# Patient Record
Sex: FEMALE | Race: WHITE | Employment: OTHER | URBAN - METROPOLITAN AREA
[De-identification: names, ages, dates, MRNs, and addresses within clinical notes are randomized per-mention and may not be internally consistent; named-entity substitution may affect disease eponyms.]

---

## 2022-05-01 ENCOUNTER — HOSPITAL ENCOUNTER (INPATIENT)
Age: 77
LOS: 3 days | Discharge: HOME | DRG: 300 | End: 2022-05-04
Attending: STUDENT IN AN ORGANIZED HEALTH CARE EDUCATION/TRAINING PROGRAM | Admitting: INTERNAL MEDICINE
Payer: MEDICARE

## 2022-05-01 ENCOUNTER — APPOINTMENT (OUTPATIENT)
Dept: VASCULAR SURGERY | Age: 77
DRG: 300 | End: 2022-05-01
Attending: PHYSICIAN ASSISTANT
Payer: MEDICARE

## 2022-05-01 DIAGNOSIS — E11.65 TYPE 2 DIABETES MELLITUS WITH HYPERGLYCEMIA, WITHOUT LONG-TERM CURRENT USE OF INSULIN (HCC): ICD-10-CM

## 2022-05-01 DIAGNOSIS — I77.6 VASCULITIS (HCC): ICD-10-CM

## 2022-05-01 DIAGNOSIS — I82.413 DVT OF DEEP FEMORAL VEIN, BILATERAL (HCC): Primary | ICD-10-CM

## 2022-05-01 DIAGNOSIS — I71.40 ABDOMINAL AORTIC ANEURYSM (AAA) WITHOUT RUPTURE: ICD-10-CM

## 2022-05-01 DIAGNOSIS — R06.02 SOB (SHORTNESS OF BREATH): ICD-10-CM

## 2022-05-01 PROBLEM — I82.409 DVT (DEEP VENOUS THROMBOSIS) (HCC): Status: ACTIVE | Noted: 2022-05-01

## 2022-05-01 PROBLEM — E11.9 DM (DIABETES MELLITUS) (HCC): Status: ACTIVE | Noted: 2022-05-01

## 2022-05-01 PROBLEM — O22.30 DVT (DEEP VEIN THROMBOSIS) IN PREGNANCY: Status: ACTIVE | Noted: 2022-05-01

## 2022-05-01 PROBLEM — J44.9 COPD (CHRONIC OBSTRUCTIVE PULMONARY DISEASE) (HCC): Status: ACTIVE | Noted: 2022-05-01

## 2022-05-01 PROBLEM — L03.90 CELLULITIS: Status: ACTIVE | Noted: 2022-05-01

## 2022-05-01 LAB
ANION GAP SERPL CALC-SCNC: 7 MMOL/L (ref 3–18)
APPEARANCE UR: CLEAR
APTT PPP: 34.7 SEC (ref 23–36.4)
APTT PPP: >180 SEC (ref 23–36.4)
BACTERIA URNS QL MICRO: ABNORMAL /HPF
BASOPHILS # BLD: 0.1 K/UL (ref 0–0.1)
BASOPHILS NFR BLD: 1 % (ref 0–2)
BILIRUB UR QL: NEGATIVE
BUN SERPL-MCNC: 14 MG/DL (ref 7–18)
BUN/CREAT SERPL: 16 (ref 12–20)
CALCIUM SERPL-MCNC: 9.2 MG/DL (ref 8.5–10.1)
CHLORIDE SERPL-SCNC: 104 MMOL/L (ref 100–111)
CO2 SERPL-SCNC: 29 MMOL/L (ref 21–32)
COLOR UR: YELLOW
CREAT SERPL-MCNC: 0.88 MG/DL (ref 0.6–1.3)
CRP SERPL-MCNC: 0.7 MG/DL (ref 0–0.3)
DIFFERENTIAL METHOD BLD: ABNORMAL
EOSINOPHIL # BLD: 0.2 K/UL (ref 0–0.4)
EOSINOPHIL NFR BLD: 3 % (ref 0–5)
EPITH CASTS URNS QL MICRO: ABNORMAL /LPF (ref 0–5)
ERYTHROCYTE [DISTWIDTH] IN BLOOD BY AUTOMATED COUNT: 13.5 % (ref 11.6–14.5)
ERYTHROCYTE [SEDIMENTATION RATE] IN BLOOD: 19 MM/HR (ref 0–30)
GLUCOSE BLD STRIP.AUTO-MCNC: 199 MG/DL (ref 70–110)
GLUCOSE SERPL-MCNC: 183 MG/DL (ref 74–99)
GLUCOSE UR STRIP.AUTO-MCNC: NEGATIVE MG/DL
HCT VFR BLD AUTO: 47 % (ref 35–45)
HGB BLD-MCNC: 15.7 G/DL (ref 12–16)
HGB UR QL STRIP: NEGATIVE
IMM GRANULOCYTES # BLD AUTO: 0 K/UL (ref 0–0.04)
IMM GRANULOCYTES NFR BLD AUTO: 0 % (ref 0–0.5)
INR PPP: 1 (ref 0.8–1.2)
KETONES UR QL STRIP.AUTO: NEGATIVE MG/DL
LACTATE BLD-SCNC: 1.98 MMOL/L (ref 0.4–2)
LACTATE BLD-SCNC: 2.27 MMOL/L (ref 0.4–2)
LEUKOCYTE ESTERASE UR QL STRIP.AUTO: ABNORMAL
LYMPHOCYTES # BLD: 1.6 K/UL (ref 0.9–3.6)
LYMPHOCYTES NFR BLD: 22 % (ref 21–52)
MCH RBC QN AUTO: 30.1 PG (ref 24–34)
MCHC RBC AUTO-ENTMCNC: 33.4 G/DL (ref 31–37)
MCV RBC AUTO: 90.2 FL (ref 78–100)
MONOCYTES # BLD: 0.5 K/UL (ref 0.05–1.2)
MONOCYTES NFR BLD: 7 % (ref 3–10)
NEUTS SEG # BLD: 5.1 K/UL (ref 1.8–8)
NEUTS SEG NFR BLD: 68 % (ref 40–73)
NITRITE UR QL STRIP.AUTO: NEGATIVE
NRBC # BLD: 0 K/UL (ref 0–0.01)
NRBC BLD-RTO: 0 PER 100 WBC
PH UR STRIP: 5.5 [PH] (ref 5–8)
PLATELET # BLD AUTO: 334 K/UL (ref 135–420)
PMV BLD AUTO: 9.5 FL (ref 9.2–11.8)
POTASSIUM SERPL-SCNC: 3.4 MMOL/L (ref 3.5–5.5)
PROT UR STRIP-MCNC: NEGATIVE MG/DL
PROTHROMBIN TIME: 13.3 SEC (ref 11.5–15.2)
RBC # BLD AUTO: 5.21 M/UL (ref 4.2–5.3)
RBC #/AREA URNS HPF: ABNORMAL /HPF (ref 0–5)
SODIUM SERPL-SCNC: 140 MMOL/L (ref 136–145)
SP GR UR REFRACTOMETRY: 1.01 (ref 1–1.03)
TROPONIN-HIGH SENSITIVITY: 5 NG/L (ref 0–54)
UROBILINOGEN UR QL STRIP.AUTO: 0.2 EU/DL (ref 0.2–1)
WBC # BLD AUTO: 7.5 K/UL (ref 4.6–13.2)
WBC URNS QL MICRO: ABNORMAL /HPF (ref 0–5)

## 2022-05-01 PROCEDURE — 74011250636 HC RX REV CODE- 250/636: Performed by: INTERNAL MEDICINE

## 2022-05-01 PROCEDURE — 85730 THROMBOPLASTIN TIME PARTIAL: CPT

## 2022-05-01 PROCEDURE — 80048 BASIC METABOLIC PNL TOTAL CA: CPT

## 2022-05-01 PROCEDURE — 74011250636 HC RX REV CODE- 250/636: Performed by: PHYSICIAN ASSISTANT

## 2022-05-01 PROCEDURE — 94761 N-INVAS EAR/PLS OXIMETRY MLT: CPT

## 2022-05-01 PROCEDURE — 85025 COMPLETE CBC W/AUTO DIFF WBC: CPT

## 2022-05-01 PROCEDURE — 36415 COLL VENOUS BLD VENIPUNCTURE: CPT

## 2022-05-01 PROCEDURE — 74011250637 HC RX REV CODE- 250/637: Performed by: INTERNAL MEDICINE

## 2022-05-01 PROCEDURE — 81001 URINALYSIS AUTO W/SCOPE: CPT

## 2022-05-01 PROCEDURE — 85652 RBC SED RATE AUTOMATED: CPT

## 2022-05-01 PROCEDURE — 99285 EMERGENCY DEPT VISIT HI MDM: CPT

## 2022-05-01 PROCEDURE — 86140 C-REACTIVE PROTEIN: CPT

## 2022-05-01 PROCEDURE — 74011000258 HC RX REV CODE- 258: Performed by: INTERNAL MEDICINE

## 2022-05-01 PROCEDURE — 83605 ASSAY OF LACTIC ACID: CPT

## 2022-05-01 PROCEDURE — 74011000250 HC RX REV CODE- 250: Performed by: INTERNAL MEDICINE

## 2022-05-01 PROCEDURE — 87040 BLOOD CULTURE FOR BACTERIA: CPT

## 2022-05-01 PROCEDURE — 93970 EXTREMITY STUDY: CPT

## 2022-05-01 PROCEDURE — 85610 PROTHROMBIN TIME: CPT

## 2022-05-01 PROCEDURE — 84484 ASSAY OF TROPONIN QUANT: CPT

## 2022-05-01 PROCEDURE — 65270000046 HC RM TELEMETRY

## 2022-05-01 PROCEDURE — C9113 INJ PANTOPRAZOLE SODIUM, VIA: HCPCS | Performed by: INTERNAL MEDICINE

## 2022-05-01 PROCEDURE — 82962 GLUCOSE BLOOD TEST: CPT

## 2022-05-01 RX ORDER — FUROSEMIDE 40 MG/1
TABLET ORAL DAILY
COMMUNITY

## 2022-05-01 RX ORDER — POTASSIUM CHLORIDE 20 MEQ/1
40 TABLET, EXTENDED RELEASE ORAL EVERY 4 HOURS
Status: COMPLETED | OUTPATIENT
Start: 2022-05-01 | End: 2022-05-01

## 2022-05-01 RX ORDER — DILTIAZEM HYDROCHLORIDE 60 MG/1
TABLET, FILM COATED ORAL 4 TIMES DAILY
COMMUNITY

## 2022-05-01 RX ORDER — METFORMIN HYDROCHLORIDE 500 MG/1
TABLET ORAL 2 TIMES DAILY WITH MEALS
COMMUNITY

## 2022-05-01 RX ORDER — SODIUM CHLORIDE 0.9 % (FLUSH) 0.9 %
5-40 SYRINGE (ML) INJECTION EVERY 8 HOURS
Status: DISCONTINUED | OUTPATIENT
Start: 2022-05-01 | End: 2022-05-04 | Stop reason: HOSPADM

## 2022-05-01 RX ORDER — ATORVASTATIN CALCIUM 40 MG/1
TABLET, FILM COATED ORAL DAILY
COMMUNITY

## 2022-05-01 RX ORDER — MUPIROCIN 20 MG/G
OINTMENT TOPICAL DAILY
COMMUNITY

## 2022-05-01 RX ORDER — SODIUM CHLORIDE 0.9 % (FLUSH) 0.9 %
5-40 SYRINGE (ML) INJECTION AS NEEDED
Status: DISCONTINUED | OUTPATIENT
Start: 2022-05-01 | End: 2022-05-04 | Stop reason: HOSPADM

## 2022-05-01 RX ORDER — HEPARIN SODIUM 10000 [USP'U]/100ML
18-36 INJECTION, SOLUTION INTRAVENOUS
Status: DISCONTINUED | OUTPATIENT
Start: 2022-05-01 | End: 2022-05-01 | Stop reason: RX

## 2022-05-01 RX ORDER — POLYETHYLENE GLYCOL 3350 17 G/17G
17 POWDER, FOR SOLUTION ORAL DAILY PRN
Status: DISCONTINUED | OUTPATIENT
Start: 2022-05-01 | End: 2022-05-04 | Stop reason: HOSPADM

## 2022-05-01 RX ORDER — ACETAMINOPHEN 325 MG/1
650 TABLET ORAL
Status: DISCONTINUED | OUTPATIENT
Start: 2022-05-01 | End: 2022-05-04 | Stop reason: HOSPADM

## 2022-05-01 RX ORDER — ACETAMINOPHEN 650 MG/1
650 SUPPOSITORY RECTAL
Status: DISCONTINUED | OUTPATIENT
Start: 2022-05-01 | End: 2022-05-04 | Stop reason: HOSPADM

## 2022-05-01 RX ORDER — HEPARIN SODIUM 1000 [USP'U]/ML
80 INJECTION, SOLUTION INTRAVENOUS; SUBCUTANEOUS ONCE
Status: COMPLETED | OUTPATIENT
Start: 2022-05-01 | End: 2022-05-01

## 2022-05-01 RX ORDER — ONDANSETRON 4 MG/1
4 TABLET, ORALLY DISINTEGRATING ORAL
Status: DISCONTINUED | OUTPATIENT
Start: 2022-05-01 | End: 2022-05-04 | Stop reason: HOSPADM

## 2022-05-01 RX ORDER — GABAPENTIN 300 MG/1
300 CAPSULE ORAL 3 TIMES DAILY
COMMUNITY

## 2022-05-01 RX ORDER — ASPIRIN 81 MG/1
TABLET ORAL DAILY
COMMUNITY

## 2022-05-01 RX ORDER — IPRATROPIUM BROMIDE AND ALBUTEROL SULFATE 2.5; .5 MG/3ML; MG/3ML
3 SOLUTION RESPIRATORY (INHALATION)
Status: DISCONTINUED | OUTPATIENT
Start: 2022-05-01 | End: 2022-05-03 | Stop reason: SDUPTHER

## 2022-05-01 RX ORDER — ONDANSETRON 2 MG/ML
4 INJECTION INTRAMUSCULAR; INTRAVENOUS
Status: DISCONTINUED | OUTPATIENT
Start: 2022-05-01 | End: 2022-05-04 | Stop reason: HOSPADM

## 2022-05-01 RX ORDER — IBUPROFEN 200 MG
1 TABLET ORAL
Status: COMPLETED | OUTPATIENT
Start: 2022-05-01 | End: 2022-05-02

## 2022-05-01 RX ORDER — GLIMEPIRIDE 2 MG/1
TABLET ORAL 2 TIMES DAILY
COMMUNITY

## 2022-05-01 RX ORDER — IBUPROFEN 200 MG
1 TABLET ORAL DAILY
Status: DISCONTINUED | OUTPATIENT
Start: 2022-05-02 | End: 2022-05-01

## 2022-05-01 RX ADMIN — PIPERACILLIN AND TAZOBACTAM 3.38 G: 3; .375 INJECTION, POWDER, LYOPHILIZED, FOR SOLUTION INTRAVENOUS at 20:03

## 2022-05-01 RX ADMIN — POTASSIUM CHLORIDE 40 MEQ: 20 TABLET, EXTENDED RELEASE ORAL at 20:01

## 2022-05-01 RX ADMIN — SODIUM CHLORIDE, PRESERVATIVE FREE 40 MG: 5 INJECTION INTRAVENOUS at 20:02

## 2022-05-01 RX ADMIN — HEPARIN SODIUM 9800 UNITS: 1000 INJECTION INTRAVENOUS; SUBCUTANEOUS at 16:13

## 2022-05-01 RX ADMIN — SODIUM CHLORIDE, PRESERVATIVE FREE 5 ML: 5 INJECTION INTRAVENOUS at 22:00

## 2022-05-01 RX ADMIN — HEPARIN SODIUM 18 UNITS/KG/HR: 5000 INJECTION INTRAVENOUS; SUBCUTANEOUS at 17:58

## 2022-05-01 RX ADMIN — POTASSIUM CHLORIDE 40 MEQ: 20 TABLET, EXTENDED RELEASE ORAL at 23:26

## 2022-05-01 NOTE — ROUTINE PROCESS
Bedside and Verbal shift change report given to Vic (oncoming nurse) by Des Christianson RN (offgoing nurse). Report included the following information SBAR, Kardex, MAR and Recent Results.

## 2022-05-01 NOTE — Clinical Note
Status[de-identified] INPATIENT [101]   Type of Bed: Telemetry [19]   Cardiac Monitoring Required?: No   Inpatient Hospitalization Certified Necessary for the Following Reasons: 3.  Patient receiving treatment that can only be provided in an inpatient setting (further clarification in H&P documentation)   Admitting Diagnosis: DVT (deep vein thrombosis) in pregnancy [[id number]]   Admitting Diagnosis: Vasculitis [facility]) [[id number]]   Admitting Physician: Coral Benitez [[id number]]   Attending Physician: Coral Benitez [[id number]]   Estimated Length of Stay: 2 Midnights   Discharge Plan[de-identified] Home with Office Follow-up

## 2022-05-01 NOTE — ED TRIAGE NOTES
Patient reports that she has cellulitis to right ankle. [facility] and [facility] told her to come to ed they could not help her. States she is here visiting with daughter. Patient reports she has had this before and it had debridement.  This started this time about a week or so ago

## 2022-05-01 NOTE — H&P
History & Physical    Patient: Yonas Gaffney MRN: [id number]  CSN: [id number]    YOB: 1945  Age: 78 y.o. Sex: female      DOA: 5/1/2022    Chief Complaint:   Chief Complaint   Patient presents with    Skin Problem          HPI:     Yonas Gaffney is a 78 y.o.  female who with history of COPD, diabetes, spinal stenosis, abdominal aortic aneurysm tobacco disorder presents to the emergency room at the urging of urgent care for worsening cellulitis. Patient had lower extremity edema and pain  As well as swelling of the right lower leg she was started on Bactroban and Keflex in urgent care. Pictures were taken prior  Antibiotics the swelling and redness in her lower extremity continued. Patient also noticed that she had some worsening dyspnea with exertion she does have COPD. She traveled from [place] to visit her daughter. And then 1 day later traveled by car to [place]. Her daughter also works in EMS and EMS  Her daughter also works as a medical transporter and she had been taking badge with her daughter. She smokes about a pack a day she denies any COVID exposures she is triple vaccinated  In the emergency room she was found to have bilateral DVT into the femoral vein I am asked to admit for further treatment of her vasculitis bilateral leg DVT. Past Medical History:   Diagnosis Date    AAA (abdominal aortic aneurysm) ([facility] [address]     its not big enough to operate on yet she sees a vascular dr Len Bailey Chronic obstructive pulmonary disease ([facility] [address]     Diabetes ([facility] [address]     Spinal stenosis        No past surgical history on file. No family history on file.     Social History     Socioeconomic History    Marital status:    Tobacco Use    Smoking status: Current Every Day Smoker     Packs/day: 0.50   Substance and Sexual Activity    Alcohol use: Not Currently    Drug use: Never       Prior to Admission medications    Not on File       Allergies   Allergen Reactions  Crestor [Rosuvastatin] Myalgia         Review of Systems  GENERAL: Patient alert, awake and oriented times 3, able to communicate full sentences and not in distress. HEENT: No change in vision, no earache, tinnitus, sore throat or sinus congestion. NECK: No pain or stiffness. PULMONARY: +shortness of breath, cough or wheeze. Cardiovascular: no pnd or orthopnea, no CP  GASTROINTESTINAL: No abdominal pain, nausea, vomiting or diarrhea, melena or bright red blood per rectum. GENITOURINARY: No urinary frequency, urgency, hesitancy or dysuria. MUSCULOSKELETAL: No joint or muscle pain, no back pain, no recent trauma. DERMATOLOGIC+ rash, no itching, no lesions. ENDOCRINE: No polyuria, polydipsia, no heat or cold intolerance. No recent change in weight. HEMATOLOGICAL: No anemia or easy bruising or bleeding. NEUROLOGIC: No headache, seizures, numbness, tingling +r weakness. Physical Exam:     Physical Exam:  Visit Vitals  BP (!) 145/76 (BP 1 Location: Left arm)   Pulse 85   Temp 97.3 °F (36.3 °C)   Resp 16   Ht 5' 7\" (1.702 m)   Wt 122.5 kg (270 lb)   SpO2 97%   BMI 42.29 kg/m²      O2 Device: None (Room air)    Temp (24hrs), Av.3 °F (36.3 °C), Min:97.3 °F (36.3 °C), Max:97.3 °F (36.3 °C)    No intake/output data recorded. No intake/output data recorded. General:  Alert, cooperative, no distress, appears stated age. Head: Normocephalic, without obvious abnormality, atraumatic. Eyes:  Conjunctivae/corneas clear. PERRL, EOMs intact. Nose: Nares normal. No drainage or sinus tenderness. Neck: Supple, symmetrical, trachea midline, no adenopathy, thyroid: no enlargement, no carotid bruit and no JVD. Lungs:   Clear to auscultation bilaterally. Occasional expiratory wheeze   Heart:  Regular rate and rhythm, S1, S2 normal.  Soft systolic murmur     Abdomen: Soft, non-tender.  Bowel sounds normal.    Extremities: Extremities normal, atraumatic, bilateral stasis edema with a macular papular rash with warmth and erythema on the right lateral malleolus of the lower extremity there is warmth and blanching as well as petechial changes   Pulses: 2+ and symmetric all extremities. Skin:  vasculitis rash on lower leg right lateral maleus worse than left flat feet bilateral surgical scar   Neurologic: AAOx3, No focal motor or sensory deficit. Labs Reviewed:  Recent Results (from the past 12 hour(s))   CBC WITH AUTOMATED DIFF    Collection Time: 05/01/22  2:04 PM   Result Value Ref Range    WBC 7.5 4.6 - 13.2 K/uL    RBC 5.21 4.20 - 5.30 M/uL    HGB 15.7 12.0 - 16.0 g/dL    HCT 47.0 (H) 35.0 - 45.0 %    MCV 90.2 78.0 - 100.0 FL    MCH 30.1 24.0 - 34.0 PG    MCHC 33.4 31.0 - 37.0 g/dL    RDW 13.5 11.6 - 14.5 %    PLATELET 850 094 - 231 K/uL    MPV 9.5 9.2 - 11.8 FL    NRBC 0.0 0  WBC    ABSOLUTE NRBC 0.00 0.00 - 0.01 K/uL    NEUTROPHILS 68 40 - 73 %    LYMPHOCYTES 22 21 - 52 %    MONOCYTES 7 3 - 10 %    EOSINOPHILS 3 0 - 5 %    BASOPHILS 1 0 - 2 %    IMMATURE GRANULOCYTES 0 0.0 - 0.5 %    ABS. NEUTROPHILS 5.1 1.8 - 8.0 K/UL    ABS. LYMPHOCYTES 1.6 0.9 - 3.6 K/UL    ABS. MONOCYTES 0.5 0.05 - 1.2 K/UL    ABS. EOSINOPHILS 0.2 0.0 - 0.4 K/UL    ABS. BASOPHILS 0.1 0.0 - 0.1 K/UL    ABS. IMM.  GRANS. 0.0 0.00 - 0.04 K/UL    DF AUTOMATED     METABOLIC PANEL, BASIC    Collection Time: 05/01/22  2:04 PM   Result Value Ref Range    Sodium 140 136 - 145 mmol/L    Potassium 3.4 (L) 3.5 - 5.5 mmol/L    Chloride 104 100 - 111 mmol/L    CO2 29 21 - 32 mmol/L    Anion gap 7 3.0 - 18 mmol/L    Glucose 183 (H) 74 - 99 mg/dL    BUN 14 7.0 - 18 MG/DL    Creatinine 0.88 0.6 - 1.3 MG/DL    BUN/Creatinine ratio 16 12 - 20      GFR est AA >60 >60 ml/min/1.73m2    GFR est non-AA >60 >60 ml/min/1.73m2    Calcium 9.2 8.5 - 10.1 MG/DL   PROTHROMBIN TIME + INR    Collection Time: 05/01/22  2:04 PM   Result Value Ref Range    Prothrombin time 13.3 11.5 - 15.2 sec    INR 1.0 0.8 - 1.2     PTT    Collection Time: 05/01/22  2:04 PM   Result Value Ref Range    aPTT 34.7 23.0 - 36.4 SEC   SED RATE (ESR)    Collection Time: 05/01/22  2:04 PM   Result Value Ref Range    Sed rate, automated 19 0 - 30 mm/hr   POC LACTIC ACID    Collection Time: 05/01/22  2:55 PM   Result Value Ref Range    Lactic Acid (POC) 2.27 (HH) 0.40 - 2.00 mmol/L   POC LACTIC ACID    Collection Time: 05/01/22  3:01 PM   Result Value Ref Range    Lactic Acid (POC) 1.98 0.40 - 2.00 mmol/L   URINALYSIS W/ RFLX MICROSCOPIC    Collection Time: 05/01/22  4:08 PM   Result Value Ref Range    Color YELLOW      Appearance CLEAR      Specific gravity 1.008 1.005 - 1.030      pH (UA) 5.5 5.0 - 8.0      Protein Negative NEG mg/dL    Glucose Negative NEG mg/dL    Ketone Negative NEG mg/dL    Bilirubin Negative NEG      Blood Negative NEG      Urobilinogen 0.2 0.2 - 1.0 EU/dL    Nitrites Negative NEG      Leukocyte Esterase TRACE (A) NEG     URINE MICROSCOPIC ONLY    Collection Time: 05/01/22  4:08 PM   Result Value Ref Range    WBC 0 to 3 0 - 5 /hpf    RBC 0 to 3 0 - 5 /hpf    Epithelial cells FEW 0 - 5 /lpf    Bacteria FEW (A) NEG /hpf       All lab results for the last 24 hours reviewed.   .  And EKG  Was  Procedures/imaging: see electronic medical records for all procedures/Xrays and details which were not copied into this note but were reviewed prior to creation of Plan    Recent Results (from the past 12 hour(s))   CBC WITH AUTOMATED DIFF    Collection Time: 05/01/22  2:04 PM   Result Value Ref Range    WBC 7.5 4.6 - 13.2 K/uL    RBC 5.21 4.20 - 5.30 M/uL    HGB 15.7 12.0 - 16.0 g/dL    HCT 47.0 (H) 35.0 - 45.0 %    MCV 90.2 78.0 - 100.0 FL    MCH 30.1 24.0 - 34.0 PG    MCHC 33.4 31.0 - 37.0 g/dL    RDW 13.5 11.6 - 14.5 %    PLATELET 670 605 - 725 K/uL    MPV 9.5 9.2 - 11.8 FL    NRBC 0.0 0  WBC    ABSOLUTE NRBC 0.00 0.00 - 0.01 K/uL    NEUTROPHILS 68 40 - 73 %    LYMPHOCYTES 22 21 - 52 %    MONOCYTES 7 3 - 10 %    EOSINOPHILS 3 0 - 5 %    BASOPHILS 1 0 - 2 % IMMATURE GRANULOCYTES 0 0.0 - 0.5 %    ABS. NEUTROPHILS 5.1 1.8 - 8.0 K/UL    ABS. LYMPHOCYTES 1.6 0.9 - 3.6 K/UL    ABS. MONOCYTES 0.5 0.05 - 1.2 K/UL    ABS. EOSINOPHILS 0.2 0.0 - 0.4 K/UL    ABS. BASOPHILS 0.1 0.0 - 0.1 K/UL    ABS. IMM.  GRANS. 0.0 0.00 - 0.04 K/UL    DF AUTOMATED     METABOLIC PANEL, BASIC    Collection Time: 05/01/22  2:04 PM   Result Value Ref Range    Sodium 140 136 - 145 mmol/L    Potassium 3.4 (L) 3.5 - 5.5 mmol/L    Chloride 104 100 - 111 mmol/L    CO2 29 21 - 32 mmol/L    Anion gap 7 3.0 - 18 mmol/L    Glucose 183 (H) 74 - 99 mg/dL    BUN 14 7.0 - 18 MG/DL    Creatinine 0.88 0.6 - 1.3 MG/DL    BUN/Creatinine ratio 16 12 - 20      GFR est AA >60 >60 ml/min/1.73m2    GFR est non-AA >60 >60 ml/min/1.73m2    Calcium 9.2 8.5 - 10.1 MG/DL   PROTHROMBIN TIME + INR    Collection Time: 05/01/22  2:04 PM   Result Value Ref Range    Prothrombin time 13.3 11.5 - 15.2 sec    INR 1.0 0.8 - 1.2     PTT    Collection Time: 05/01/22  2:04 PM   Result Value Ref Range    aPTT 34.7 23.0 - 36.4 SEC   POC LACTIC ACID    Collection Time: 05/01/22  2:55 PM   Result Value Ref Range    Lactic Acid (POC) 2.27 (HH) 0.40 - 2.00 mmol/L   POC LACTIC ACID    Collection Time: 05/01/22  3:01 PM   Result Value Ref Range    Lactic Acid (POC) 1.98 0.40 - 2.00 mmol/L   URINALYSIS W/ RFLX MICROSCOPIC    Collection Time: 05/01/22  4:08 PM   Result Value Ref Range    Color YELLOW      Appearance CLEAR      Specific gravity 1.008 1.005 - 1.030      pH (UA) 5.5 5.0 - 8.0      Protein Negative NEG mg/dL    Glucose Negative NEG mg/dL    Ketone Negative NEG mg/dL    Bilirubin Negative NEG      Blood Negative NEG      Urobilinogen 0.2 0.2 - 1.0 EU/dL    Nitrites Negative NEG      Leukocyte Esterase TRACE (A) NEG     URINE MICROSCOPIC ONLY    Collection Time: 05/01/22  4:08 PM   Result Value Ref Range    WBC 0 to 3 0 - 5 /hpf    RBC 0 to 3 0 - 5 /hpf    Epithelial cells FEW 0 - 5 /lpf    Bacteria FEW (A) NEG /hpf     Assessment/Plan Active Problems:  21-year-old with history of COPD diabetes abdominal aneurysm admitted with bilateral DVT and shortness of breath as well as cellulitis    1. Bilateral DVT  She started on a heparin drip we will monitor for bleeding with daily CBCs  She can transition to oral NOAC after 24 to 48 hours    2. Dyspnea with COPD   we will check a CTA started on Zosyn DuoNebs as needed  Also check echocardiogram  3. Cellulitis   lower extremity IV Zosyn failed Keflex  4. History of abdominal aneurysm   now on anticoagulation will check CTA keep blood pressure under good control  5. Diabetes   no concentrated sweet diet sliding scale insulin hold oral agents low-dose Lantus  6.   Tobacco disorder   advised to quit started on patch  DVT/GI Prophylaxis: Heparin drip PPI with Protonix                    Camille Gifford MD  5/1/2022 4:37 PM

## 2022-05-01 NOTE — ED PROVIDER NOTES
EMERGENCY DEPARTMENT HISTORY AND PHYSICAL EXAM    Date: 5/1/2022  Patient Name: Yuri Valderrama    History of Presenting Illness     Time Seen: 2:05 PM      Chief Complaint   Patient presents with    Skin Problem       History Provided By: Patient and Patient's Daughter    Additional History (Context): Yuri Valderrama is a 78 y.o. female visiting family here from [place] presents to the emergency room after being sent here by local urgent care center for worsening cellulitis of the right lower extremity. Patient was seen and evaluated at [facility] urgent care [place] last week and was diagnosed with cellulitis. Placed on mupirocin ointment and Keflex. Symptoms worsening. Now has swelling and edema involving the lower extremity mid calf all the way down to her feet. Redness seems to be getting little worse. Denies fever. No chills or aches. Swelling worsens when patient is on her feet. Denies any bleeding or drainage from the reddened area on the lateral ankle. No recent open wounds. Patient is diabetic currently on metformin. PCP: Other, MD Елена        Past History     Past Medical History:  Past Medical History:   Diagnosis Date    AAA (abdominal aortic aneurysm) ([facility] [address]     its not big enough to operate on yet she sees a vascular dr   Tutu Reeves Chronic obstructive pulmonary disease ([facility] [address]     Diabetes ([facility] [address]     Spinal stenosis        Past Surgical History:  No past surgical history on file. Family History:  No family history on file. Social History:  Social History     Tobacco Use    Smoking status: Current Every Day Smoker     Packs/day: 0.50    Smokeless tobacco: Not on file   Substance Use Topics    Alcohol use: Not Currently    Drug use: Never       Allergies: Allergies   Allergen Reactions    Crestor [Rosuvastatin] Myalgia         Review of Systems   Review of Systems   Constitutional: Negative for chills and fever.    Respiratory: Negative for cough, chest tightness and shortness of breath. Cardiovascular: Positive for leg swelling. Negative for chest pain and palpitations. Gastrointestinal: Negative for abdominal pain. Musculoskeletal:        Right leg swelling  Right lower leg pain   Skin: Positive for color change. Negative for pallor, rash and wound. Neurological: Positive for numbness. Negative for weakness and light-headedness. All other systems reviewed and are negative. Physical Exam     Vitals:    22 1341 22 1730   BP: (!) 145/76 (!) 133/57   Pulse: 85 73   Resp: 16 17   Temp: 97.3 °F (36.3 °C)    SpO2: 97% 97%   Weight: 122.5 kg (270 lb)    Height: 5' 7\" (1.702 m)      Physical Exam  Vitals and nursing note reviewed. Constitutional:       General: She is not in acute distress. Appearance: She is well-developed and well-groomed. She is obese. She is not ill-appearing or diaphoretic. Comments: 82-year-old female no apparent distress. Vital signs are stable. Cooperative. HENT:      Mouth/Throat:      Mouth: Mucous membranes are moist.      Pharynx: Oropharynx is clear. Eyes:      Extraocular Movements: Extraocular movements intact. Conjunctiva/sclera: Conjunctivae normal.      Pupils: Pupils are equal, round, and reactive to light. Cardiovascular:      Rate and Rhythm: Normal rate and regular rhythm. Heart sounds: Normal heart sounds. Pulmonary:      Effort: Pulmonary effort is normal.      Breath sounds: Normal breath sounds. Abdominal:      General: Bowel sounds are normal.      Palpations: Abdomen is soft. Tenderness: There is no abdominal tenderness. There is no right CVA tenderness or left CVA tenderness. Musculoskeletal:         General: Swelling present. No tenderness or signs of injury. Cervical back: Neck supple. Right lower le+ Pitting Edema present. Left lower le+ Pitting Edema present.         Feet:       Comments: Right lower extremity -obvious edema noted to right lower extremity involving the mid lower leg extending distally through the ankle and foot. Pitting. Along the lateral aspect of the ankle there is a large erythematous patch. Raised. Minimal warmth. Nontender. No drainage. There is surrounding erythema again no warmth or induration. Nontender. There are old surgical incisions noted to bilateral knees anteriorly in a vertical fashion. There is evidence of varicosities noted to both lower extremities more dominant in the lower legs, ankles and feet. Skin:     General: Skin is warm and dry. Capillary Refill: Capillary refill takes less than 2 seconds. Findings: Petechiae and rash present. Comments: There are petechial markings noted to both lower extremities starting mid lower legs down through the ankles. Right leg is worse than left. Neurological:      Mental Status: She is alert and oriented to person, place, and time. Psychiatric:         Mood and Affect: Mood normal.         Behavior: Behavior normal. Behavior is cooperative. Nursing note and vitals reviewed         Diagnostic Study Results     Labs -     Recent Results (from the past 12 hour(s))   CBC WITH AUTOMATED DIFF    Collection Time: 05/01/22  2:04 PM   Result Value Ref Range    WBC 7.5 4.6 - 13.2 K/uL    RBC 5.21 4.20 - 5.30 M/uL    HGB 15.7 12.0 - 16.0 g/dL    HCT 47.0 (H) 35.0 - 45.0 %    MCV 90.2 78.0 - 100.0 FL    MCH 30.1 24.0 - 34.0 PG    MCHC 33.4 31.0 - 37.0 g/dL    RDW 13.5 11.6 - 14.5 %    PLATELET 936 327 - 785 K/uL    MPV 9.5 9.2 - 11.8 FL    NRBC 0.0 0  WBC    ABSOLUTE NRBC 0.00 0.00 - 0.01 K/uL    NEUTROPHILS 68 40 - 73 %    LYMPHOCYTES 22 21 - 52 %    MONOCYTES 7 3 - 10 %    EOSINOPHILS 3 0 - 5 %    BASOPHILS 1 0 - 2 %    IMMATURE GRANULOCYTES 0 0.0 - 0.5 %    ABS. NEUTROPHILS 5.1 1.8 - 8.0 K/UL    ABS. LYMPHOCYTES 1.6 0.9 - 3.6 K/UL    ABS. MONOCYTES 0.5 0.05 - 1.2 K/UL    ABS. EOSINOPHILS 0.2 0.0 - 0.4 K/UL    ABS.  BASOPHILS 0.1 0.0 - 0.1 K/UL    ABS. IMM. GRANS. 0.0 0.00 - 0.04 K/UL    DF AUTOMATED     METABOLIC PANEL, BASIC    Collection Time: 05/01/22  2:04 PM   Result Value Ref Range    Sodium 140 136 - 145 mmol/L    Potassium 3.4 (L) 3.5 - 5.5 mmol/L    Chloride 104 100 - 111 mmol/L    CO2 29 21 - 32 mmol/L    Anion gap 7 3.0 - 18 mmol/L    Glucose 183 (H) 74 - 99 mg/dL    BUN 14 7.0 - 18 MG/DL    Creatinine 0.88 0.6 - 1.3 MG/DL    BUN/Creatinine ratio 16 12 - 20      GFR est AA >60 >60 ml/min/1.73m2    GFR est non-AA >60 >60 ml/min/1.73m2    Calcium 9.2 8.5 - 10.1 MG/DL   PROTHROMBIN TIME + INR    Collection Time: 05/01/22  2:04 PM   Result Value Ref Range    Prothrombin time 13.3 11.5 - 15.2 sec    INR 1.0 0.8 - 1.2     PTT    Collection Time: 05/01/22  2:04 PM   Result Value Ref Range    aPTT 34.7 23.0 - 36.4 SEC   POC LACTIC ACID    Collection Time: 05/01/22  2:55 PM   Result Value Ref Range    Lactic Acid (POC) 2.27 (HH) 0.40 - 2.00 mmol/L   POC LACTIC ACID    Collection Time: 05/01/22  3:01 PM   Result Value Ref Range    Lactic Acid (POC) 1.98 0.40 - 2.00 mmol/L   URINALYSIS W/ RFLX MICROSCOPIC    Collection Time: 05/01/22  4:08 PM   Result Value Ref Range    Color YELLOW      Appearance CLEAR      Specific gravity 1.008 1.005 - 1.030      pH (UA) 5.5 5.0 - 8.0      Protein Negative NEG mg/dL    Glucose Negative NEG mg/dL    Ketone Negative NEG mg/dL    Bilirubin Negative NEG      Blood Negative NEG      Urobilinogen 0.2 0.2 - 1.0 EU/dL    Nitrites Negative NEG      Leukocyte Esterase TRACE (A) NEG     URINE MICROSCOPIC ONLY    Collection Time: 05/01/22  4:08 PM   Result Value Ref Range    WBC 0 to 3 0 - 5 /hpf    RBC 0 to 3 0 - 5 /hpf    Epithelial cells FEW 0 - 5 /lpf    Bacteria FEW (A) NEG /hpf       Radiologic Studies   Interpretation Summary    · Acute non-occlusive thrombus present in the right proximal femoral vein. · Acute non-occlusive thrombus present in the right profunda femoral vein.   · Acute non-occlusive thrombus present in the left profunda femoral vein. · Acute non-occlusive thrombus present in the left proximal femoral vein. DUPLEX LOWER EXT VENOUS BILAT    (Results Pending)   CTA CHEST W OR W WO CONT    (Results Pending)   CT ABD PELV W CONT    (Results Pending)     CT Results  (Last 48 hours)    None        CXR Results  (Last 48 hours)    None            Medical Decision Making   I am the first provider for this patient. I reviewed the vital signs, available nursing notes, past medical history, past surgical history, family history and social history. Vital Signs-Reviewed the patient's vital signs. Pulse Oximetry Analysis 97% on room air    Records Reviewed: Nursing Notes and Old Medical Records    DDX:  Vasculitis bilateral lower extremities right greater than left  Cellulitis right lower extremity  Peripheral edema /venous insufficiency  Diabetes mellitus type 2    Procedures:  Procedures    ED Course:   Initial assessment performed. The patients presenting problems have been discussed, and they are in agreement with the care plan formulated and outlined with them. I have encouraged them to ask questions as they arise throughout their visit. ED Physician Discussion Note:   Had ER attending Dr. Charles Galo come back and take a look at the patient after I initially evaluated her with concerns that her inflammation was more vasculitis than infectious process/cellulitis. After evaluating the patient, he agreed this is most likely vasculitis. However impressed with the amount of edema that the patient was experienced in her legs. Believes this could very well be multifactorial.    Patient and daughter did admit that they have been doing a lot of traveling. Travel down [address] as well as around here. Per the daughter, mother goes on daily travels with her as she works for medical transport.     Dr. Charles Galo recommended getting venous Doppler ultrasounds of both lower extremities just to make sure there is no abnormality. Consideration of the right lower extremity was already being contemplated. Vascular tech noted that patient has bilateral DVTs. Patient informed of these results. Patient will now require admission to the hospital.    Patient was initiated on IV heparin due to diagnosis of DVT. This case was discussed with hospitalist attending Dr. Maxime Palmer. Patient will be admitted to the hospital for further evaluation and treatment. Diagnosis and Disposition       Admit - Telemetry Dr. Maxime Palmer    CLINICAL IMPRESSION:    1. DVT of deep femoral vein, bilateral (HCC)    2. Vasculitis ([address]    3. Type 2 diabetes mellitus with hyperglycemia, without long-term current use of insulin (HCC)    4. Abdominal aortic aneurysm (AAA) without rupture ([address]        PLAN:  1. Pancho Palmer    Telemetry      Please note that this dictation was completed with [employer], the computer voice recognition software. Quite often unanticipated grammatical, syntax, homophones, and other interpretive errors are inadvertently transcribed by the computer software. Please disregard these errors. Please excuse any errors that have escaped final proofreading.

## 2022-05-02 ENCOUNTER — APPOINTMENT (OUTPATIENT)
Dept: NON INVASIVE DIAGNOSTICS | Age: 77
DRG: 300 | End: 2022-05-02
Attending: INTERNAL MEDICINE
Payer: MEDICARE

## 2022-05-02 ENCOUNTER — APPOINTMENT (OUTPATIENT)
Dept: CT IMAGING | Age: 77
DRG: 300 | End: 2022-05-02
Attending: INTERNAL MEDICINE
Payer: MEDICARE

## 2022-05-02 LAB
ALBUMIN SERPL-MCNC: 2.8 G/DL (ref 3.4–5)
ALBUMIN/GLOB SERPL: 0.9 {RATIO} (ref 0.8–1.7)
ALP SERPL-CCNC: 94 U/L (ref 45–117)
ALT SERPL-CCNC: 15 U/L (ref 13–56)
ANION GAP SERPL CALC-SCNC: 5 MMOL/L (ref 3–18)
APTT PPP: 102.7 SEC (ref 23–36.4)
APTT PPP: 133.2 SEC (ref 23–36.4)
APTT PPP: 143 SEC (ref 23–36.4)
APTT PPP: >180 SEC (ref 23–36.4)
AST SERPL-CCNC: 11 U/L (ref 10–38)
BASOPHILS # BLD: 0.1 K/UL (ref 0–0.1)
BASOPHILS NFR BLD: 1 % (ref 0–2)
BILIRUB SERPL-MCNC: 0.4 MG/DL (ref 0.2–1)
BUN SERPL-MCNC: 14 MG/DL (ref 7–18)
BUN/CREAT SERPL: 17 (ref 12–20)
CALCIUM SERPL-MCNC: 8.6 MG/DL (ref 8.5–10.1)
CHLORIDE SERPL-SCNC: 109 MMOL/L (ref 100–111)
CO2 SERPL-SCNC: 27 MMOL/L (ref 21–32)
CREAT SERPL-MCNC: 0.83 MG/DL (ref 0.6–1.3)
DIFFERENTIAL METHOD BLD: ABNORMAL
EOSINOPHIL # BLD: 0.2 K/UL (ref 0–0.4)
EOSINOPHIL NFR BLD: 4 % (ref 0–5)
ERYTHROCYTE [DISTWIDTH] IN BLOOD BY AUTOMATED COUNT: 13.5 % (ref 11.6–14.5)
EST. AVERAGE GLUCOSE BLD GHB EST-MCNC: 180 MG/DL
GLOBULIN SER CALC-MCNC: 3.1 G/DL (ref 2–4)
GLUCOSE BLD STRIP.AUTO-MCNC: 118 MG/DL (ref 70–110)
GLUCOSE BLD STRIP.AUTO-MCNC: 220 MG/DL (ref 70–110)
GLUCOSE BLD STRIP.AUTO-MCNC: 224 MG/DL (ref 70–110)
GLUCOSE SERPL-MCNC: 146 MG/DL (ref 74–99)
HBA1C MFR BLD: 7.9 % (ref 4.2–5.6)
HCT VFR BLD AUTO: 40.9 % (ref 35–45)
HGB BLD-MCNC: 13.4 G/DL (ref 12–16)
IMM GRANULOCYTES # BLD AUTO: 0 K/UL (ref 0–0.04)
IMM GRANULOCYTES NFR BLD AUTO: 1 % (ref 0–0.5)
LYMPHOCYTES # BLD: 1.8 K/UL (ref 0.9–3.6)
LYMPHOCYTES NFR BLD: 29 % (ref 21–52)
MAGNESIUM SERPL-MCNC: 1.9 MG/DL (ref 1.6–2.6)
MCH RBC QN AUTO: 29.5 PG (ref 24–34)
MCHC RBC AUTO-ENTMCNC: 32.8 G/DL (ref 31–37)
MCV RBC AUTO: 89.9 FL (ref 78–100)
MONOCYTES # BLD: 0.5 K/UL (ref 0.05–1.2)
MONOCYTES NFR BLD: 7 % (ref 3–10)
NEUTS SEG # BLD: 3.6 K/UL (ref 1.8–8)
NEUTS SEG NFR BLD: 58 % (ref 40–73)
NRBC # BLD: 0 K/UL (ref 0–0.01)
NRBC BLD-RTO: 0 PER 100 WBC
PLATELET # BLD AUTO: 298 K/UL (ref 135–420)
PMV BLD AUTO: 9.7 FL (ref 9.2–11.8)
POTASSIUM SERPL-SCNC: 4.3 MMOL/L (ref 3.5–5.5)
PROT SERPL-MCNC: 5.9 G/DL (ref 6.4–8.2)
RBC # BLD AUTO: 4.55 M/UL (ref 4.2–5.3)
SODIUM SERPL-SCNC: 141 MMOL/L (ref 136–145)
WBC # BLD AUTO: 6.1 K/UL (ref 4.6–13.2)

## 2022-05-02 PROCEDURE — 74011250637 HC RX REV CODE- 250/637: Performed by: INTERNAL MEDICINE

## 2022-05-02 PROCEDURE — C9113 INJ PANTOPRAZOLE SODIUM, VIA: HCPCS | Performed by: INTERNAL MEDICINE

## 2022-05-02 PROCEDURE — 85025 COMPLETE CBC W/AUTO DIFF WBC: CPT

## 2022-05-02 PROCEDURE — 36415 COLL VENOUS BLD VENIPUNCTURE: CPT

## 2022-05-02 PROCEDURE — 74011000258 HC RX REV CODE- 258: Performed by: INTERNAL MEDICINE

## 2022-05-02 PROCEDURE — 85730 THROMBOPLASTIN TIME PARTIAL: CPT

## 2022-05-02 PROCEDURE — 82962 GLUCOSE BLOOD TEST: CPT

## 2022-05-02 PROCEDURE — 83036 HEMOGLOBIN GLYCOSYLATED A1C: CPT

## 2022-05-02 PROCEDURE — 83735 ASSAY OF MAGNESIUM: CPT

## 2022-05-02 PROCEDURE — 74011250636 HC RX REV CODE- 250/636: Performed by: PHYSICIAN ASSISTANT

## 2022-05-02 PROCEDURE — 74011000636 HC RX REV CODE- 636: Performed by: INTERNAL MEDICINE

## 2022-05-02 PROCEDURE — 80053 COMPREHEN METABOLIC PANEL: CPT

## 2022-05-02 PROCEDURE — 74011000250 HC RX REV CODE- 250: Performed by: INTERNAL MEDICINE

## 2022-05-02 PROCEDURE — 65270000046 HC RM TELEMETRY

## 2022-05-02 PROCEDURE — 74011250636 HC RX REV CODE- 250/636: Performed by: INTERNAL MEDICINE

## 2022-05-02 PROCEDURE — 74011636637 HC RX REV CODE- 636/637: Performed by: INTERNAL MEDICINE

## 2022-05-02 PROCEDURE — 71275 CT ANGIOGRAPHY CHEST: CPT

## 2022-05-02 RX ORDER — INSULIN LISPRO 100 [IU]/ML
INJECTION, SOLUTION INTRAVENOUS; SUBCUTANEOUS
Status: DISCONTINUED | OUTPATIENT
Start: 2022-05-02 | End: 2022-05-04 | Stop reason: HOSPADM

## 2022-05-02 RX ORDER — MUPIROCIN 20 MG/G
OINTMENT TOPICAL DAILY
Status: DISCONTINUED | OUTPATIENT
Start: 2022-05-03 | End: 2022-05-04 | Stop reason: HOSPADM

## 2022-05-02 RX ORDER — IPRATROPIUM BROMIDE AND ALBUTEROL SULFATE 2.5; .5 MG/3ML; MG/3ML
3 SOLUTION RESPIRATORY (INHALATION)
Status: DISCONTINUED | OUTPATIENT
Start: 2022-05-02 | End: 2022-05-04 | Stop reason: HOSPADM

## 2022-05-02 RX ORDER — DEXTROSE MONOHYDRATE 100 MG/ML
0-250 INJECTION, SOLUTION INTRAVENOUS AS NEEDED
Status: DISCONTINUED | OUTPATIENT
Start: 2022-05-02 | End: 2022-05-04 | Stop reason: HOSPADM

## 2022-05-02 RX ORDER — DILTIAZEM HYDROCHLORIDE 60 MG/1
60 TABLET, FILM COATED ORAL 4 TIMES DAILY
Status: DISCONTINUED | OUTPATIENT
Start: 2022-05-02 | End: 2022-05-04 | Stop reason: HOSPADM

## 2022-05-02 RX ORDER — ATORVASTATIN CALCIUM 20 MG/1
40 TABLET, FILM COATED ORAL
Status: DISCONTINUED | OUTPATIENT
Start: 2022-05-02 | End: 2022-05-04 | Stop reason: HOSPADM

## 2022-05-02 RX ORDER — GABAPENTIN 300 MG/1
300 CAPSULE ORAL 3 TIMES DAILY
Status: DISCONTINUED | OUTPATIENT
Start: 2022-05-02 | End: 2022-05-04 | Stop reason: HOSPADM

## 2022-05-02 RX ORDER — ASPIRIN 81 MG/1
81 TABLET ORAL DAILY
Status: DISCONTINUED | OUTPATIENT
Start: 2022-05-03 | End: 2022-05-04 | Stop reason: HOSPADM

## 2022-05-02 RX ORDER — MAGNESIUM SULFATE 100 %
4 CRYSTALS MISCELLANEOUS AS NEEDED
Status: DISCONTINUED | OUTPATIENT
Start: 2022-05-02 | End: 2022-05-04 | Stop reason: HOSPADM

## 2022-05-02 RX ORDER — INSULIN GLARGINE 100 [IU]/ML
10 INJECTION, SOLUTION SUBCUTANEOUS DAILY
Status: DISCONTINUED | OUTPATIENT
Start: 2022-05-03 | End: 2022-05-04 | Stop reason: HOSPADM

## 2022-05-02 RX ADMIN — GABAPENTIN 300 MG: 300 CAPSULE ORAL at 21:31

## 2022-05-02 RX ADMIN — HEPARIN SODIUM 12 UNITS/KG/HR: 5000 INJECTION INTRAVENOUS; SUBCUTANEOUS at 10:16

## 2022-05-02 RX ADMIN — SODIUM CHLORIDE, PRESERVATIVE FREE 10 ML: 5 INJECTION INTRAVENOUS at 22:00

## 2022-05-02 RX ADMIN — PIPERACILLIN AND TAZOBACTAM 3.38 G: 3; .375 INJECTION, POWDER, LYOPHILIZED, FOR SOLUTION INTRAVENOUS at 18:22

## 2022-05-02 RX ADMIN — ATORVASTATIN CALCIUM 40 MG: 20 TABLET, FILM COATED ORAL at 21:31

## 2022-05-02 RX ADMIN — SODIUM CHLORIDE, PRESERVATIVE FREE 5 ML: 5 INJECTION INTRAVENOUS at 06:30

## 2022-05-02 RX ADMIN — IOPAMIDOL 100 ML: 755 INJECTION, SOLUTION INTRAVENOUS at 00:50

## 2022-05-02 RX ADMIN — PIPERACILLIN AND TAZOBACTAM 3.38 G: 3; .375 INJECTION, POWDER, LYOPHILIZED, FOR SOLUTION INTRAVENOUS at 06:30

## 2022-05-02 RX ADMIN — SODIUM CHLORIDE, PRESERVATIVE FREE 40 MG: 5 INJECTION INTRAVENOUS at 18:22

## 2022-05-02 RX ADMIN — DILTIAZEM HYDROCHLORIDE 60 MG: 60 TABLET, FILM COATED ORAL at 17:21

## 2022-05-02 RX ADMIN — PIPERACILLIN AND TAZOBACTAM 3.38 G: 3; .375 INJECTION, POWDER, LYOPHILIZED, FOR SOLUTION INTRAVENOUS at 13:06

## 2022-05-02 RX ADMIN — PIPERACILLIN AND TAZOBACTAM 3.38 G: 3; .375 INJECTION, POWDER, LYOPHILIZED, FOR SOLUTION INTRAVENOUS at 01:05

## 2022-05-02 RX ADMIN — Medication 4 UNITS: at 21:28

## 2022-05-02 RX ADMIN — SODIUM CHLORIDE, PRESERVATIVE FREE 10 ML: 5 INJECTION INTRAVENOUS at 13:07

## 2022-05-02 RX ADMIN — GABAPENTIN 300 MG: 300 CAPSULE ORAL at 17:21

## 2022-05-02 NOTE — DIABETES MGMT
Diabetes/ Glycemic Control Plan of Care  Recommendations:   Recommend IP subcutaneous order set to include POC monitoring ACHS, corrective Humalog ACHS, and A1c. Consider initiation of basal insulin      Assessment: Patient with a history of diabetes. BG ranging 146-199 mg/dl. DX:   1. DVT of deep femoral vein, bilateral (HCC)     2. Vasculitis ([address]     3. Type 2 diabetes mellitus with hyperglycemia, without long-term current use of insulin (HCC)     4. Abdominal aortic aneurysm (AAA) without rupture (HCC)         Recent Glucose Results:   Lab Results   Component Value Date/Time     (H) 05/02/2022 05:23 AM     (H) 05/01/2022 02:04 PM    GLUCPOC 199 (H) 05/01/2022 09:12 PM            BG within target range (non-ICU: <180; -180):  [] Yes    [x] No   Current insulin orders: none  Total Daily Dose previous 24 hours = none    Nutrition/Diet:   Active Orders   Diet    ADULT DIET Regular; 5 carb choices (75 gm/meal)      Home diabetes medications:   Key Antihyperglycemic Medications             glimepiride (AMARYL) 2 mg tablet (Taking) Take  by mouth two (2) times a day. metFORMIN (GLUCOPHAGE) 500 mg tablet (Taking) Take  by mouth two (2) times daily (with meals). Plan/Goals:   Blood glucose will be within target of 70 - 180 mg/dl within 72 hours  Reinforce dietary and medication compliance at home.           Education:  [x] Refer to Diabetes Education Record                       [] Education not indicated at this time                                                         Diabetes Patient/Family Education Record    Factors That May Influence Patients Ability to Learn or Comply with Recommendations   []   Language barrier    []   Cultural needs   []   Motivation    []   Cognitive limitation    []   Physical   []   Education    []   Physiological factors   []   Hearing/vision/speaking impairment   []   Taoist beliefs    []   Financial factors   []  Other:   [x]  No factors identified at this time. Person Instructed:   [x]   Patient   []   Family   []  Other     Preference for Learning:   [x]   Verbal   []   Written   []  Demonstration     Level of Comprehension & Competence:    [x]  Good                                      [] Fair                                     []  Poor                             []  Needs Reinforcement   []  Teach back completed    Education Component: Patient reports compliance with daily diabetes medications. She used to take Jardiance however discontinued due to cost. She routinely sees her PCP to manage her diabetes. She monitors her BG however somewhat inconsistently. Patient reports upset stomach with metformin, encouraged to take with food to minimize discomfort. [x]  Medication management, - take metformin with food    []  Nutritional management - [] Obtained usual meal pattern   []   Basic carbohydrate counting  []  Plate method  []  Limit concentrated sweets and avoid sweetened beverages  []  Portion control  []    Avoid skipping meals   []  Exercise   []  Signs, symptoms, and treatment of hyperglycemia and hypoglycemia   [] Prevention, recognition and treatment of hyperglycemia and hypoglycemia   [x]  Importance of blood glucose monitoring  [] Blood Glucose targets   []   Provided patient with blood glucose meter  [x]  Has glucometer and supplies at home   [x]  Instruction on use of the blood glucose meter and recommended monitoring schedule   []  Discuss the importance of HbA1C monitoring. Patients A1c is ___ %.  This is equivalent to average glucose of ___ mg/dl for the past 2-3 months.   []  Sick day guidelines   []  Proper use and disposal of lancets, needles, syringes or insulin pens (if appropriate)   []  Potential long-term complications (retinopathy, kidney disease, neuropathy, foot care)   [] Information about whom to contact in case of emergency or for more information    []  Goal:  Patient/family will demonstrate understanding of Diabetes Self- Management Skills by: (date) __5/9_____  Plan for post-discharge education or self-management support:    [] Outpatient class schedule provided            [] Patient Declined    [] Scheduled for outpatient classes (date) _______    [] Written information provided  Verify: [x] Prior to admission Diabetes medications    Does patient understand how diabetes medications work? ____Yes - finances ________________________  Does patient have difficulty obtaining diabetes medications or testing supplies? _________________          Ella Cruz RD  Glycemic Control Team  [phone]    Monday-Friday   9 am - 3 pm

## 2022-05-02 NOTE — PROGRESS NOTES
Pt being moved upstairs. Will contact floor once she is assigned a room.  [address] [facility]

## 2022-05-02 NOTE — PROGRESS NOTES
Father Benjamin Agent visited with   Harley Wood, who is a 78 y.o.,female. Father Benjamin Agent provided [employer]. Father Benjamin Agent provided [employer] and [employer] Chaplains will continue to follow and will provide pastoral care on an as needed/requested basis.  recommends bedside caregivers page  on duty if patient shows signs of acute spiritual or emotional distress.      Fr Felisa Lincoln, [address] [facility] - Office

## 2022-05-02 NOTE — PROGRESS NOTES
Hospitalist Progress Note    Patient: Shruthi Courtney MRN: [id number]  CSN: [id number]    YOB: 1945  Age: 78 y.o. Sex: female    DOA: 5/1/2022 LOS:  LOS: 1 day          Chief Complaint:    62-year-old female with history of diabetes, hypertension, aneurysm, presents for admission for bilateral DVT and cellulitis  After failed outpatient therapy for cellulitis    Assessment/Plan   1. Bilateral DVT patient is on heparin drip will transition to NOAC tomorrow start physical therapy  2. Cellulitis improving on IV Zosyn  3. Diabetes on sliding scale insulin and Lantus  4. Tobacco disorder advised to quit nicotine patch  5. 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 COPD CTA no PE albuterol Atrovent. Patient Active Problem List   Diagnosis Code    Vasculitis ([facility]) I77.6    DVT (deep vein thrombosis) in pregnancy O22.30    DVT (deep venous thrombosis) ([facility]) I82.409    Cellulitis L03.90    DM (diabetes mellitus) ([facility] E11.9    COPD (chronic obstructive pulmonary disease) ([facility]) J44.9       Subjective:      I feel better daughter is at bedside review of studies  Review of systems:    Constitutional: denies fevers, chills, myalgias  Respiratory: denies SOB, cough  Cardiovascular: denies chest pain, palpitations  Gastrointestinal: denies nausea, vomiting, diarrhea      Vital signs/Intake and Output:  Visit Vitals  /63   Pulse 68   Temp 97.8 °F (36.6 °C)   Resp 17   Ht 5' 7\" (1.702 m)   Wt 122.5 kg (270 lb)   SpO2 94%   BMI 42.29 kg/m²     Current Shift:  No intake/output data recorded. Last three shifts:  No intake/output data recorded.     Exam:    General: Well developed, alert, NAD, OX3  Head/Neck: NCAT, supple, No masses, No lymphadenopathy  CVS:Regular rate and rhythm, no M/R/G, S1/S2 heard, no thrill  Lungs:Clear to auscultation bilaterally, no wheezes, rhonchi, or rales  Abdomen: Soft, Nontender, No distention, Normal Bowel sounds, No hepatomegaly  Extremities: Decreased erythema of the ankle decreased swelling  Skin:normal texture and turgor, no rashes, no lesions  Neuro:grossly normal , follows commands  Psych:appropriate                Labs: Results:       Chemistry Recent Labs     05/02/22  0523 05/01/22  1404   * 183*    140   K 4.3 3.4*    104   CO2 27 29   BUN 14 14   CREA 0.83 0.88   CA 8.6 9.2   AGAP 5 7   BUCR 17 16   AP 94  --    TP 5.9*  --    ALB 2.8*  --    GLOB 3.1  --    AGRAT 0.9  --       CBC w/Diff Recent Labs     05/02/22 0523 05/01/22  1404   WBC 6.1 7.5   RBC 4.55 5.21   HGB 13.4 15.7   HCT 40.9 47.0*    334   GRANS 58 68   LYMPH 29 22   EOS 4 3      Cardiac Enzymes No results for input(s): CPK, CKND1, DEMETRIA in the last 72 hours. No lab exists for component: CKRMB, TROIP   Coagulation Recent Labs     05/02/22  0705 05/02/22  0523 05/01/22  2130 05/01/22  1404   PTP  --   --   --  13.3   INR  --   --   --  1.0   APTT >180.0* 143.0*   < > 34.7    < > = values in this interval not displayed. Lipid Panel No results found for: CHOL, CHOLPOCT, CHOLX, CHLST, CHOLV, 433255, HDL, HDLP, LDL, LDLC, DLDLP, 548940, VLDLC, VLDL, TGLX, TRIGL, TRIGP, TGLPOCT, CHHD, CHHDX   BNP No results for input(s): BNPP in the last 72 hours.    Liver Enzymes Recent Labs     05/02/22 0523   TP 5.9*   ALB 2.8*   AP 94      Thyroid Studies No results found for: T4, T3U, TSH, TSHEXT     Procedures/imaging: see electronic medical records for all procedures/Xrays and details which were not copied into this note but were reviewed prior to creation of Asuncion Bailey MD

## 2022-05-02 NOTE — PROGRESS NOTES
Reason for Admission:  C/o worsening lower right leg swelling and redness, possible cellulitis                    RUR Score:   13               PCP: First and Last name:   Other, MD Елена     Name of Practice:    Are you a current patient: Yes/No:    Approximate date of last visit:    Can you participate in a virtual visit if needed:     Do you (patient/family) have any concerns for transition/discharge? Plan for utilizing home health:  TBD     Current Advanced Directive/Advance Care Plan:  Full Code      Healthcare Decision Maker:   Click here to complete [address] including selection of the Healthcare Decision Maker Relationship (ie \"Primary\")              Transition of Care Plan:   Chart reviewed and visited pt at bedside with daughter present, cm introduced self and explained cm role as d/c planner. Pt was recently seen at a urgent care for lower extremity cellulitis, pt noted increase pain ,swelling and redness . Pt admitted for bilateral  DVT, pt has been traveling by car visiting family and also a active smoker pt lives in [place] came to [place] to visit family, plan was to drive back home with family next week if possible, pt independent with care, she does use either her rolling walker or rollator for ambulating, pt also has a walking cane if needed, when discharged pt plans to follow up with her [address] [phone], pt also has a vascular and cardiologist that she is active with, pt plans to schedule own follow up appointments. Care Management Interventions  PCP Verified by CM: Yes  Palliative Care Criteria Met (RRAT>21 & CHF Dx)?: No  Mode of Transport at Discharge:  Other (see comment) (family)  Support Systems: Child(chaim),Other Family Member(s)  Confirm Follow Up Transport: Family  The Plan for Transition of Care is Related to the Following Treatment Goals : return back to daughters home  Discharge Location  Patient Expects to be Discharged to[de-identified] Home with family assistance

## 2022-05-03 ENCOUNTER — APPOINTMENT (OUTPATIENT)
Dept: NON INVASIVE DIAGNOSTICS | Age: 77
DRG: 300 | End: 2022-05-03
Attending: INTERNAL MEDICINE
Payer: MEDICARE

## 2022-05-03 LAB
ALBUMIN SERPL-MCNC: 2.7 G/DL (ref 3.4–5)
ALBUMIN/GLOB SERPL: 0.9 {RATIO} (ref 0.8–1.7)
ALP SERPL-CCNC: 83 U/L (ref 45–117)
ALT SERPL-CCNC: 17 U/L (ref 13–56)
ANION GAP SERPL CALC-SCNC: 7 MMOL/L (ref 3–18)
APTT PPP: 90 SEC (ref 23–36.4)
APTT PPP: 96 SEC (ref 23–36.4)
APTT PPP: >180 SEC (ref 23–36.4)
AST SERPL-CCNC: 15 U/L (ref 10–38)
BASOPHILS # BLD: 0.1 K/UL (ref 0–0.1)
BASOPHILS NFR BLD: 1 % (ref 0–2)
BILIRUB SERPL-MCNC: 0.3 MG/DL (ref 0.2–1)
BUN SERPL-MCNC: 13 MG/DL (ref 7–18)
BUN/CREAT SERPL: 16 (ref 12–20)
CALCIUM SERPL-MCNC: 8.4 MG/DL (ref 8.5–10.1)
CHLORIDE SERPL-SCNC: 110 MMOL/L (ref 100–111)
CO2 SERPL-SCNC: 25 MMOL/L (ref 21–32)
CREAT SERPL-MCNC: 0.82 MG/DL (ref 0.6–1.3)
DIFFERENTIAL METHOD BLD: ABNORMAL
ECHO AO ASC DIAM: 3.6 CM
ECHO AO ASCENDING AORTA INDEX: 1.57 CM/M2
ECHO AO ROOT DIAM: 3.5 CM
ECHO AO ROOT INDEX: 1.52 CM/M2
ECHO AV AREA PEAK VELOCITY: 2.3 CM2
ECHO AV AREA VTI: 2.4 CM2
ECHO AV AREA/BSA PEAK VELOCITY: 1 CM2/M2
ECHO AV AREA/BSA VTI: 1 CM2/M2
ECHO AV MEAN GRADIENT: 3 MMHG
ECHO AV MEAN VELOCITY: 0.8 M/S
ECHO AV PEAK GRADIENT: 5 MMHG
ECHO AV PEAK VELOCITY: 1.1 M/S
ECHO AV VELOCITY RATIO: 0.82
ECHO AV VTI: 27.6 CM
ECHO LA VOL 2C: 55 ML (ref 22–52)
ECHO LA VOL 4C: 61 ML (ref 22–52)
ECHO LA VOL BP: 62 ML (ref 22–52)
ECHO LA VOL/BSA BIPLANE: 27 ML/M2 (ref 16–34)
ECHO LA VOLUME AREA LENGTH: 64 ML
ECHO LA VOLUME INDEX A2C: 24 ML/M2 (ref 16–34)
ECHO LA VOLUME INDEX A4C: 27 ML/M2 (ref 16–34)
ECHO LA VOLUME INDEX AREA LENGTH: 28 ML/M2 (ref 16–34)
ECHO LV E' LATERAL VELOCITY: 7 CM/S
ECHO LV E' SEPTAL VELOCITY: 32 CM/S
ECHO LV EDV A2C: 103 ML
ECHO LV EDV A4C: 107 ML
ECHO LV EDV BP: 105 ML (ref 56–104)
ECHO LV EDV INDEX A4C: 47 ML/M2
ECHO LV EDV INDEX BP: 46 ML/M2
ECHO LV EDV NDEX A2C: 45 ML/M2
ECHO LV EJECTION FRACTION A2C: 48 %
ECHO LV EJECTION FRACTION A4C: 57 %
ECHO LV EJECTION FRACTION BIPLANE: 51 % (ref 55–100)
ECHO LV ESV A2C: 54 ML
ECHO LV ESV A4C: 47 ML
ECHO LV ESV BP: 51 ML (ref 19–49)
ECHO LV ESV INDEX A2C: 23 ML/M2
ECHO LV ESV INDEX A4C: 20 ML/M2
ECHO LV ESV INDEX BP: 22 ML/M2
ECHO LV FRACTIONAL SHORTENING: 38 % (ref 28–44)
ECHO LV INTERNAL DIMENSION DIASTOLE INDEX: 2.04 CM/M2
ECHO LV INTERNAL DIMENSION DIASTOLIC: 4.7 CM (ref 3.9–5.3)
ECHO LV INTERNAL DIMENSION SYSTOLIC INDEX: 1.26 CM/M2
ECHO LV INTERNAL DIMENSION SYSTOLIC: 2.9 CM
ECHO LV IVSD: 1.3 CM (ref 0.6–0.9)
ECHO LV MASS 2D: 224.8 G (ref 67–162)
ECHO LV MASS INDEX 2D: 97.7 G/M2 (ref 43–95)
ECHO LV POSTERIOR WALL DIASTOLIC: 1.2 CM (ref 0.6–0.9)
ECHO LV RELATIVE WALL THICKNESS RATIO: 0.51
ECHO LVOT AREA: 2.8 CM2
ECHO LVOT AV VTI INDEX: 0.86
ECHO LVOT CARDIAC OUTPUT: 0 LITER/MINUTE
ECHO LVOT DIAM: 1.9 CM
ECHO LVOT MEAN GRADIENT: 2 MMHG
ECHO LVOT PEAK GRADIENT: 3 MMHG
ECHO LVOT PEAK VELOCITY: 0.9 M/S
ECHO LVOT STROKE VOLUME INDEX: 29.2 ML/M2
ECHO LVOT SV: 67.2 ML
ECHO LVOT VTI: 23.7 CM
ECHO MV A VELOCITY: 0.83 M/S
ECHO MV E DECELERATION TIME (DT): 266.5 MS
ECHO MV E VELOCITY: 0.72 M/S
ECHO MV E/A RATIO: 0.87
ECHO MV E/E' LATERAL: 10.29
ECHO MV E/E' RATIO (AVERAGED): 6.27
ECHO MV E/E' SEPTAL: 2.25
ECHO PULMONARY ARTERY END DIASTOLIC PRESSURE: 6 MMHG
ECHO PV REGURGITANT MAX VELOCITY: 1.2 M/S
ECHO RV INTERNAL DIMENSION: 4.3 CM
ECHO RV TAPSE: 1.7 CM (ref 1.7–?)
ECHO TV REGURGITANT MAX VELOCITY: 1.72 M/S
ECHO TV REGURGITANT PEAK GRADIENT: 12 MMHG
EOSINOPHIL # BLD: 0.2 K/UL (ref 0–0.4)
EOSINOPHIL NFR BLD: 4 % (ref 0–5)
ERYTHROCYTE [DISTWIDTH] IN BLOOD BY AUTOMATED COUNT: 13.4 % (ref 11.6–14.5)
GLOBULIN SER CALC-MCNC: 3 G/DL (ref 2–4)
GLUCOSE BLD STRIP.AUTO-MCNC: 155 MG/DL (ref 70–110)
GLUCOSE BLD STRIP.AUTO-MCNC: 156 MG/DL (ref 70–110)
GLUCOSE BLD STRIP.AUTO-MCNC: 159 MG/DL (ref 70–110)
GLUCOSE BLD STRIP.AUTO-MCNC: 174 MG/DL (ref 70–110)
GLUCOSE SERPL-MCNC: 133 MG/DL (ref 74–99)
HCT VFR BLD AUTO: 41.1 % (ref 35–45)
HGB BLD-MCNC: 13.1 G/DL (ref 12–16)
IMM GRANULOCYTES # BLD AUTO: 0 K/UL (ref 0–0.04)
IMM GRANULOCYTES NFR BLD AUTO: 1 % (ref 0–0.5)
LYMPHOCYTES # BLD: 1.9 K/UL (ref 0.9–3.6)
LYMPHOCYTES NFR BLD: 32 % (ref 21–52)
MAGNESIUM SERPL-MCNC: 2 MG/DL (ref 1.6–2.6)
MCH RBC QN AUTO: 29.9 PG (ref 24–34)
MCHC RBC AUTO-ENTMCNC: 31.9 G/DL (ref 31–37)
MCV RBC AUTO: 93.8 FL (ref 78–100)
MONOCYTES # BLD: 0.5 K/UL (ref 0.05–1.2)
MONOCYTES NFR BLD: 8 % (ref 3–10)
NEUTS SEG # BLD: 3.3 K/UL (ref 1.8–8)
NEUTS SEG NFR BLD: 54 % (ref 40–73)
NRBC # BLD: 0 K/UL (ref 0–0.01)
NRBC BLD-RTO: 0 PER 100 WBC
PLATELET # BLD AUTO: 285 K/UL (ref 135–420)
PMV BLD AUTO: 9.7 FL (ref 9.2–11.8)
POTASSIUM SERPL-SCNC: 3.8 MMOL/L (ref 3.5–5.5)
PROT SERPL-MCNC: 5.7 G/DL (ref 6.4–8.2)
RBC # BLD AUTO: 4.38 M/UL (ref 4.2–5.3)
SODIUM SERPL-SCNC: 142 MMOL/L (ref 136–145)
WBC # BLD AUTO: 6 K/UL (ref 4.6–13.2)

## 2022-05-03 PROCEDURE — 36415 COLL VENOUS BLD VENIPUNCTURE: CPT

## 2022-05-03 PROCEDURE — 74011000258 HC RX REV CODE- 258: Performed by: INTERNAL MEDICINE

## 2022-05-03 PROCEDURE — 85730 THROMBOPLASTIN TIME PARTIAL: CPT

## 2022-05-03 PROCEDURE — 83735 ASSAY OF MAGNESIUM: CPT

## 2022-05-03 PROCEDURE — 74011250636 HC RX REV CODE- 250/636: Performed by: INTERNAL MEDICINE

## 2022-05-03 PROCEDURE — 80053 COMPREHEN METABOLIC PANEL: CPT

## 2022-05-03 PROCEDURE — C8929 TTE W OR WO FOL WCON,DOPPLER: HCPCS

## 2022-05-03 PROCEDURE — 74011250636 HC RX REV CODE- 250/636: Performed by: PHYSICIAN ASSISTANT

## 2022-05-03 PROCEDURE — C9113 INJ PANTOPRAZOLE SODIUM, VIA: HCPCS | Performed by: INTERNAL MEDICINE

## 2022-05-03 PROCEDURE — 74011250637 HC RX REV CODE- 250/637: Performed by: INTERNAL MEDICINE

## 2022-05-03 PROCEDURE — 65270000046 HC RM TELEMETRY

## 2022-05-03 PROCEDURE — 74011000250 HC RX REV CODE- 250: Performed by: INTERNAL MEDICINE

## 2022-05-03 PROCEDURE — 74011636637 HC RX REV CODE- 636/637: Performed by: INTERNAL MEDICINE

## 2022-05-03 PROCEDURE — 85025 COMPLETE CBC W/AUTO DIFF WBC: CPT

## 2022-05-03 PROCEDURE — 82962 GLUCOSE BLOOD TEST: CPT

## 2022-05-03 RX ADMIN — Medication 2 UNITS: at 21:09

## 2022-05-03 RX ADMIN — DILTIAZEM HYDROCHLORIDE 60 MG: 60 TABLET, FILM COATED ORAL at 18:07

## 2022-05-03 RX ADMIN — GABAPENTIN 300 MG: 300 CAPSULE ORAL at 21:09

## 2022-05-03 RX ADMIN — ATORVASTATIN CALCIUM 40 MG: 20 TABLET, FILM COATED ORAL at 22:00

## 2022-05-03 RX ADMIN — PIPERACILLIN AND TAZOBACTAM 3.38 G: 3; .375 INJECTION, POWDER, LYOPHILIZED, FOR SOLUTION INTRAVENOUS at 21:09

## 2022-05-03 RX ADMIN — GABAPENTIN 300 MG: 300 CAPSULE ORAL at 15:52

## 2022-05-03 RX ADMIN — HEPARIN SODIUM 9 UNITS/KG/HR: 5000 INJECTION INTRAVENOUS; SUBCUTANEOUS at 06:09

## 2022-05-03 RX ADMIN — ASPIRIN 81 MG: 81 TABLET, COATED ORAL at 08:39

## 2022-05-03 RX ADMIN — Medication 2 UNITS: at 15:52

## 2022-05-03 RX ADMIN — DILTIAZEM HYDROCHLORIDE 60 MG: 60 TABLET, FILM COATED ORAL at 12:54

## 2022-05-03 RX ADMIN — PIPERACILLIN AND TAZOBACTAM 3.38 G: 3; .375 INJECTION, POWDER, LYOPHILIZED, FOR SOLUTION INTRAVENOUS at 03:56

## 2022-05-03 RX ADMIN — Medication 2 UNITS: at 12:54

## 2022-05-03 RX ADMIN — INSULIN GLARGINE 10 UNITS: 100 INJECTION, SOLUTION SUBCUTANEOUS at 08:39

## 2022-05-03 RX ADMIN — SODIUM CHLORIDE, PRESERVATIVE FREE 10 ML: 5 INJECTION INTRAVENOUS at 22:00

## 2022-05-03 RX ADMIN — PIPERACILLIN AND TAZOBACTAM 3.38 G: 3; .375 INJECTION, POWDER, LYOPHILIZED, FOR SOLUTION INTRAVENOUS at 10:34

## 2022-05-03 RX ADMIN — SODIUM CHLORIDE, PRESERVATIVE FREE 40 MG: 5 INJECTION INTRAVENOUS at 18:07

## 2022-05-03 RX ADMIN — Medication 2 UNITS: at 07:30

## 2022-05-03 RX ADMIN — SODIUM CHLORIDE, PRESERVATIVE FREE 10 ML: 5 INJECTION INTRAVENOUS at 15:54

## 2022-05-03 RX ADMIN — GABAPENTIN 300 MG: 300 CAPSULE ORAL at 08:39

## 2022-05-03 RX ADMIN — DILTIAZEM HYDROCHLORIDE 60 MG: 60 TABLET, FILM COATED ORAL at 08:39

## 2022-05-03 RX ADMIN — MUPIROCIN: 20 OINTMENT TOPICAL at 08:39

## 2022-05-03 RX ADMIN — PIPERACILLIN AND TAZOBACTAM 3.38 G: 3; .375 INJECTION, POWDER, LYOPHILIZED, FOR SOLUTION INTRAVENOUS at 15:52

## 2022-05-03 NOTE — PROGRESS NOTES
5/3/2022 PT note: consult received and chart reviewed. Evaluation attempted. Pt currently off unit for Echo. Will f/u at later time as pt schedule allows for PT evaluation.  Thank you for this referral.   Shon De Santiago, PT

## 2022-05-03 NOTE — PROGRESS NOTES
Cm met with pt at bedside, pt sitting up on side of bed, stated she feel's a lot better, hopeful of possible discharge tomorrow, patients daughters plan to drive pt back home to [place] on Sunday, Dr. Liberty Sepulveda did inform cm that pt likely will be d/c on eliquis, cm will cont to monitor for d/c medications, pt would like cm to assist with f/u appointments and to forward d/c summary to Pcp, cms updated at this time.

## 2022-05-03 NOTE — PROGRESS NOTES
Hospitalist Progress Note    Patient: Shruthi Courtney MRN: [id number]  CSN: [id number]    YOB: 1945  Age: 78 y.o. Sex: female    DOA: 5/1/2022 LOS:  LOS: 2 days          Chief Complaint:    51-year-old female with history of diabetes, hypertension, aneurysm, presents for admission for bilateral DVT and cellulitis  After failed outpatient therapy for cellulitis    Assessment/Plan   1. Bilateral DVT patient is on heparin drip will transition to NOAC tomorrow start physical therapy  2. Cellulitis improving on IV Zosyn  3. Diabetes on sliding scale insulin and Lantus  4. Tobacco disorder advised to quit nicotine patch  5. 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 COPD CTA no PE albuterol Atrovent. Patient Active Problem List   Diagnosis Code    Vasculitis ([facility]) I77.6    DVT (deep vein thrombosis) in pregnancy O22.30    DVT (deep venous thrombosis) ([facility]) I82.409    Cellulitis L03.90    DM [facility]diabetes mellitus) [facility] E11.9    COPD (chronic obstructive pulmonary disease) ([facility]) J44.9       Subjective:      I feel better daughter is at bedside review of studies  Review of systems:    Constitutional: denies fevers, chills, myalgias  Respiratory: denies SOB, cough  Cardiovascular: denies chest pain, palpitations  Gastrointestinal: denies nausea, vomiting, diarrhea      Vital signs/Intake and Output:  Visit Vitals  BP (!) 122/56   Pulse 63   Temp 97.8 °F (36.6 °C)   Resp 18   Ht 5' 7\" (1.702 m)   Wt 122.5 kg (270 lb)   SpO2 98%   BMI 42.29 kg/m²     Current Shift:  No intake/output data recorded. Last three shifts:  No intake/output data recorded.     Exam:    General: Well developed, alert, NAD, OX3  Head/Neck: NCAT, supple, No masses, No lymphadenopathy  CVS:Regular rate and rhythm, no M/R/G, S1/S2 heard, no thrill  Lungs:Clear to auscultation bilaterally, no wheezes, rhonchi, or rales  Abdomen: Soft, Nontender, No distention, Normal Bowel sounds, No hepatomegaly  Extremities: Decreased erythema of the ankle decreased swelling  Skin:normal texture dec erase erythema            Neuro:grossly normal , follows commands  Psych:appropriate                Labs: Results:       Chemistry Recent Labs     05/03/22 0035 05/02/22 0523 05/01/22  1404   * 146* 183*    141 140   K 3.8 4.3 3.4*    109 104   CO2 25 27 29   BUN 13 14 14   CREA 0.82 0.83 0.88   CA 8.4* 8.6 9.2   AGAP 7 5 7   BUCR 16 17 16   AP 83 94  --    TP 5.7* 5.9*  --    ALB 2.7* 2.8*  --    GLOB 3.0 3.1  --    AGRAT 0.9 0.9  --       CBC w/Diff Recent Labs     05/03/22 0035 05/02/22 0523 05/01/22  1404   WBC 6.0 6.1 7.5   RBC 4.38 4.55 5.21   HGB 13.1 13.4 15.7   HCT 41.1 40.9 47.0*    298 334   GRANS 54 58 68   LYMPH 32 29 22   EOS 4 4 3      Cardiac Enzymes No results for input(s): CPK, CKND1, DEMETRIA in the last 72 hours. No lab exists for component: CKRMB, TROIP   Coagulation Recent Labs     05/03/22 0035 05/02/22  1925 05/01/22  2130 05/01/22  1404   PTP  --   --   --  13.3   INR  --   --   --  1.0   APTT >180.0* 133.2*   < > 34.7    < > = values in this interval not displayed. Lipid Panel No results found for: CHOL, CHOLPOCT, CHOLX, CHLST, CHOLV, 753469, HDL, HDLP, LDL, LDLC, DLDLP, 415897, VLDLC, VLDL, TGLX, TRIGL, TRIGP, TGLPOCT, CHHD, CHHDX   BNP No results for input(s): BNPP in the last 72 hours.    Liver Enzymes Recent Labs     05/03/22 0035   TP 5.7*   ALB 2.7*   AP 83      Thyroid Studies No results found for: T4, T3U, TSH, TSHEXT, TSHEXT     Procedures/imaging: see electronic medical records for all procedures/Xrays and details which were not copied into this note but were reviewed prior to creation of Araceli Gomez MD

## 2022-05-04 VITALS
DIASTOLIC BLOOD PRESSURE: 66 MMHG | HEART RATE: 63 BPM | WEIGHT: 270 LBS | RESPIRATION RATE: 20 BRPM | TEMPERATURE: 97.8 F | BODY MASS INDEX: 42.38 KG/M2 | OXYGEN SATURATION: 97 % | HEIGHT: 67 IN | SYSTOLIC BLOOD PRESSURE: 140 MMHG

## 2022-05-04 LAB
ALBUMIN SERPL-MCNC: 3 G/DL (ref 3.4–5)
ALBUMIN/GLOB SERPL: 0.9 {RATIO} (ref 0.8–1.7)
ALP SERPL-CCNC: 83 U/L (ref 45–117)
ALT SERPL-CCNC: 21 U/L (ref 13–56)
ANION GAP SERPL CALC-SCNC: 4 MMOL/L (ref 3–18)
APTT PPP: 127.4 SEC (ref 23–36.4)
AST SERPL-CCNC: 17 U/L (ref 10–38)
BASOPHILS # BLD: 0.1 K/UL (ref 0–0.1)
BASOPHILS NFR BLD: 1 % (ref 0–2)
BILIRUB SERPL-MCNC: 0.4 MG/DL (ref 0.2–1)
BUN SERPL-MCNC: 14 MG/DL (ref 7–18)
BUN/CREAT SERPL: 14 (ref 12–20)
CALCIUM SERPL-MCNC: 9 MG/DL (ref 8.5–10.1)
CHLORIDE SERPL-SCNC: 109 MMOL/L (ref 100–111)
CO2 SERPL-SCNC: 27 MMOL/L (ref 21–32)
CREAT SERPL-MCNC: 0.97 MG/DL (ref 0.6–1.3)
DIFFERENTIAL METHOD BLD: ABNORMAL
EOSINOPHIL # BLD: 0.3 K/UL (ref 0–0.4)
EOSINOPHIL NFR BLD: 4 % (ref 0–5)
ERYTHROCYTE [DISTWIDTH] IN BLOOD BY AUTOMATED COUNT: 13.4 % (ref 11.6–14.5)
GLOBULIN SER CALC-MCNC: 3.4 G/DL (ref 2–4)
GLUCOSE BLD STRIP.AUTO-MCNC: 135 MG/DL (ref 70–110)
GLUCOSE BLD STRIP.AUTO-MCNC: 148 MG/DL (ref 70–110)
GLUCOSE BLD STRIP.AUTO-MCNC: 158 MG/DL (ref 70–110)
GLUCOSE SERPL-MCNC: 148 MG/DL (ref 74–99)
HCT VFR BLD AUTO: 43.2 % (ref 35–45)
HGB BLD-MCNC: 13.9 G/DL (ref 12–16)
IMM GRANULOCYTES # BLD AUTO: 0 K/UL (ref 0–0.04)
IMM GRANULOCYTES NFR BLD AUTO: 1 % (ref 0–0.5)
LYMPHOCYTES # BLD: 2.1 K/UL (ref 0.9–3.6)
LYMPHOCYTES NFR BLD: 34 % (ref 21–52)
MAGNESIUM SERPL-MCNC: 2.2 MG/DL (ref 1.6–2.6)
MCH RBC QN AUTO: 29.8 PG (ref 24–34)
MCHC RBC AUTO-ENTMCNC: 32.2 G/DL (ref 31–37)
MCV RBC AUTO: 92.7 FL (ref 78–100)
MONOCYTES # BLD: 0.4 K/UL (ref 0.05–1.2)
MONOCYTES NFR BLD: 7 % (ref 3–10)
NEUTS SEG # BLD: 3.3 K/UL (ref 1.8–8)
NEUTS SEG NFR BLD: 54 % (ref 40–73)
NRBC # BLD: 0 K/UL (ref 0–0.01)
NRBC BLD-RTO: 0 PER 100 WBC
PLATELET # BLD AUTO: 297 K/UL (ref 135–420)
PMV BLD AUTO: 9.7 FL (ref 9.2–11.8)
POTASSIUM SERPL-SCNC: 4 MMOL/L (ref 3.5–5.5)
PROT SERPL-MCNC: 6.4 G/DL (ref 6.4–8.2)
RBC # BLD AUTO: 4.66 M/UL (ref 4.2–5.3)
SODIUM SERPL-SCNC: 140 MMOL/L (ref 136–145)
WBC # BLD AUTO: 6 K/UL (ref 4.6–13.2)

## 2022-05-04 PROCEDURE — 74011000250 HC RX REV CODE- 250: Performed by: INTERNAL MEDICINE

## 2022-05-04 PROCEDURE — 36415 COLL VENOUS BLD VENIPUNCTURE: CPT

## 2022-05-04 PROCEDURE — 85025 COMPLETE CBC W/AUTO DIFF WBC: CPT

## 2022-05-04 PROCEDURE — 97116 GAIT TRAINING THERAPY: CPT

## 2022-05-04 PROCEDURE — 74011250636 HC RX REV CODE- 250/636: Performed by: INTERNAL MEDICINE

## 2022-05-04 PROCEDURE — 74011000258 HC RX REV CODE- 258: Performed by: INTERNAL MEDICINE

## 2022-05-04 PROCEDURE — 74011250637 HC RX REV CODE- 250/637: Performed by: INTERNAL MEDICINE

## 2022-05-04 PROCEDURE — 74011250637 HC RX REV CODE- 250/637: Performed by: HOSPITALIST

## 2022-05-04 PROCEDURE — 74011250636 HC RX REV CODE- 250/636: Performed by: PHYSICIAN ASSISTANT

## 2022-05-04 PROCEDURE — 83735 ASSAY OF MAGNESIUM: CPT

## 2022-05-04 PROCEDURE — 74011636637 HC RX REV CODE- 636/637: Performed by: INTERNAL MEDICINE

## 2022-05-04 PROCEDURE — 97161 PT EVAL LOW COMPLEX 20 MIN: CPT

## 2022-05-04 PROCEDURE — 82962 GLUCOSE BLOOD TEST: CPT

## 2022-05-04 PROCEDURE — 85730 THROMBOPLASTIN TIME PARTIAL: CPT

## 2022-05-04 PROCEDURE — 80053 COMPREHEN METABOLIC PANEL: CPT

## 2022-05-04 RX ORDER — DOXYCYCLINE 100 MG/1
100 CAPSULE ORAL 2 TIMES DAILY
Qty: 14 CAPSULE | Refills: 0 | Status: SHIPPED | OUTPATIENT
Start: 2022-05-04 | End: 2022-05-11

## 2022-05-04 RX ADMIN — PIPERACILLIN AND TAZOBACTAM 3.38 G: 3; .375 INJECTION, POWDER, LYOPHILIZED, FOR SOLUTION INTRAVENOUS at 09:14

## 2022-05-04 RX ADMIN — APIXABAN 10 MG: 5 TABLET, FILM COATED ORAL at 11:01

## 2022-05-04 RX ADMIN — GABAPENTIN 300 MG: 300 CAPSULE ORAL at 16:42

## 2022-05-04 RX ADMIN — ASPIRIN 81 MG: 81 TABLET, COATED ORAL at 09:14

## 2022-05-04 RX ADMIN — INSULIN GLARGINE 10 UNITS: 100 INJECTION, SOLUTION SUBCUTANEOUS at 09:14

## 2022-05-04 RX ADMIN — GABAPENTIN 300 MG: 300 CAPSULE ORAL at 09:14

## 2022-05-04 RX ADMIN — HEPARIN SODIUM 9 UNITS/KG/HR: 5000 INJECTION INTRAVENOUS; SUBCUTANEOUS at 05:55

## 2022-05-04 RX ADMIN — SODIUM CHLORIDE, PRESERVATIVE FREE 10 ML: 5 INJECTION INTRAVENOUS at 06:00

## 2022-05-04 RX ADMIN — Medication 2 UNITS: at 11:40

## 2022-05-04 RX ADMIN — DILTIAZEM HYDROCHLORIDE 60 MG: 60 TABLET, FILM COATED ORAL at 09:14

## 2022-05-04 RX ADMIN — PIPERACILLIN AND TAZOBACTAM 3.38 G: 3; .375 INJECTION, POWDER, LYOPHILIZED, FOR SOLUTION INTRAVENOUS at 05:14

## 2022-05-04 RX ADMIN — HEPARIN SODIUM 7 UNITS/KG/HR: 5000 INJECTION INTRAVENOUS; SUBCUTANEOUS at 06:30

## 2022-05-04 RX ADMIN — MUPIROCIN: 20 OINTMENT TOPICAL at 09:15

## 2022-05-04 RX ADMIN — DILTIAZEM HYDROCHLORIDE 60 MG: 60 TABLET, FILM COATED ORAL at 12:49

## 2022-05-04 NOTE — PROGRESS NOTES
I discussed with the patient's family member when I went to see the patient afternoon. She was not in her room she was getting an echo. I reviewed the images the patient has a AAA as well as celiac artery stenosis. However the patient according to the family member did not complain of post prandial pain. There are no signs of weight loss due to fear of food. The patient will go to [place] in few days which she resides. 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

## 2022-05-04 NOTE — DISCHARGE SUMMARY
Discharge Summary    Patient: Imelda Dahl MRN: [id number]  CSN: [id number]    YOB: 1945  Age: 78 y.o. Sex: female    DOA: 5/1/2022 LOS:  LOS: 3 days   Discharge Date:      Primary Care Provider:  Other, MD Елена    Admission Diagnoses: DVT (deep vein thrombosis) in pregnancy [O22.30]  Vasculitis ([facility] [I77.6]  DVT (deep venous thrombosis) ([facility] [I82.409]    Discharge Diagnoses:    Problem List as of 5/4/2022 Date Reviewed: 5/3/2022          Codes Class Noted - Resolved    Vasculitis ([place] [address] ICD-10-CM: I77.6  ICD-9-CM: 447.6  5/1/2022 - Present        DVT (deep venous thrombosis) ([place] [address] ICD-10-CM: I82.409  ICD-9-CM: 453.40  5/1/2022 - Present        Cellulitis ICD-10-CM: L03.90  ICD-9-CM: 682.9  5/1/2022 - Present        DM (diabetes mellitus) ([place] [address] ICD-10-CM: E11.9  ICD-9-CM: 250.00  5/1/2022 - Present        * (Principal) COPD (chronic obstructive pulmonary disease) ([facility] ICD-10-CM: J44.9  ICD-9-CM: 223  5/1/2022 - Present              Discharge Medications:     Current Discharge Medication List      START taking these medications    Details   doxycycline (MONODOX) 100 mg capsule Take 1 Capsule by mouth two (2) times a day for 7 days. Qty: 14 Capsule, Refills: 0  Start date: 5/4/2022, End date: 5/11/2022      apixaban (ELIQUIS) 5 mg (74 tabs) starter pack Take 10 mg (two 5 mg tablets) by mouth twice a day for 7 days Followed by 5 mg (one 5 mg tablet) by mouth twice a day  Qty: 1 Dose Pack, Refills: 0  Start date: 5/4/2022         CONTINUE these medications which have NOT CHANGED    Details   gabapentin (NEURONTIN) 300 mg capsule Take 300 mg by mouth three (3) times daily. atorvastatin (LIPITOR) 40 mg tablet Take  by mouth daily. furosemide (LASIX) 40 mg tablet Take  by mouth daily. dilTIAZem IR (CARDIZEM) 60 mg tablet Take  by mouth four (4) times daily. glimepiride (AMARYL) 2 mg tablet Take  by mouth two (2) times a day.       metFORMIN (GLUCOPHAGE) 500 mg tablet Take  by mouth two (2) times daily (with meals). aspirin delayed-release 81 mg tablet Take  by mouth daily. mupirocin (BACTROBAN) 2 % ointment Apply  to affected area daily. Discharge Condition: Good    Procedures : None    Consults: Vascular Surgery      PHYSICAL EXAM   Visit Vitals  BP (!) 140/66   Pulse 63   Temp 97.8 °F (36.6 °C)   Resp 20   Ht 5' 7\" (1.702 m)   Wt 122.5 kg (270 lb)   SpO2 97%   BMI 42.29 kg/m²     General: Awake, cooperative, no acute distress    HEENT: NC, Atraumatic. PERRLA, EOMI. Anicteric sclerae. Lungs:  CTA Bilaterally. No Wheezing/Rhonchi/Rales. Heart:  Regular  rhythm,  No murmur, No Rubs, No Gallops  Abdomen: Soft, Non distended, Non tender. +Bowel sounds,   Extremities: Edema bilaterally, right ankle redness  Psych:   Not anxious or agitated. Neurologic:  No acute neurological deficits. Admission HPI :   Fabiola Bird is a 78 y.o.  female who with history of COPD, diabetes, spinal stenosis, abdominal aortic aneurysm tobacco disorder presents to the emergency room at the urging of urgent care for worsening cellulitis. Patient had lower extremity edema and pain  As well as swelling of the right lower leg she was started on Bactroban and Keflex in urgent care. Pictures were taken prior  Antibiotics the swelling and redness in her lower extremity continued. Patient also noticed that she had some worsening dyspnea with exertion she does have COPD. She traveled from [place] to visit her daughter. And then 1 day later traveled by car to [place]. Her daughter also works in EMS and EMS  Her daughter also works as a medical transporter and she had been taking badge with her daughter.   She smokes about a pack a day she denies any COVID exposures she is triple vaccinated  In the emergency room she was found to have bilateral DVT into the femoral vein I am asked to admit for further treatment of her vasculitis bilateral leg DVT.    Hospital Course :   Ms. Alondra Chapin was admitted to monitored floor, she did not had any acute events during hospitalization. Bilateral DVT -  She was started on heparin drip. CTA chest negative for PE  At discharge will switch to eliquis. Echo with normal LVF, EF of 55-60%. Cellulitis -  Started on zosyn  Will discharge on doxycycline  Keep legs elevated as much as she can. Celiac artery stenosis  History of AAA  Vascular reviewed imaging. Discussed with patient's family  She does not have post prandial pain. No weight loss. She lives in [place] and has her vascular doctor over there. Recommend continuing on anticoagulation and follow up with her vascular surgeon. DM -  Started on SSI    COPD -  No active wheezing  Used neb treatment as needed. Tobacco use -  Strongly advised to stop smoking. Activity: Activity as tolerated    Diet: Diabetic Diet    Follow-up: PCP, vascular surgery    Disposition: home    Minutes spent on discharge: 45       Labs: Results:       Chemistry Recent Labs     05/04/22 0520 05/03/22 0035 05/02/22  0523   * 133* 146*    142 141   K 4.0 3.8 4.3    110 109   CO2 27 25 27   BUN 14 13 14   CREA 0.97 0.82 0.83   CA 9.0 8.4* 8.6   AGAP 4 7 5   BUCR 14 16 17   AP 83 83 94   TP 6.4 5.7* 5.9*   ALB 3.0* 2.7* 2.8*   GLOB 3.4 3.0 3.1   AGRAT 0.9 0.9 0.9      CBC w/Diff Recent Labs     05/04/22 0520 05/03/22 0035 05/02/22  0523   WBC 6.0 6.0 6.1   RBC 4.66 4.38 4.55   HGB 13.9 13.1 13.4   HCT 43.2 41.1 40.9    285 298   GRANS 54 54 58   LYMPH 34 32 29   EOS 4 4 4      Cardiac Enzymes No results for input(s): CPK, CKND1, DEMETRIA in the last 72 hours.     No lab exists for component: CKRMB, TROIP   Coagulation Recent Labs     05/04/22 0520 05/03/22  1620   APTT 127.4* 90.0*       Lipid Panel No results found for: CHOL, CHOLPOCT, CHOLX, CHLST, CHOLV, 048687, HDL, HDLP, LDL, LDLC, DLDLP, 193430, VLDLC, VLDL, TGLX, TRIGL, TRIGP, TGLPOCT, CHHD, CHHDX   BNP No results for input(s): BNPP in the last 72 hours. Liver Enzymes Recent Labs     05/04/22  0520   TP 6.4   ALB 3.0*   AP 83      Thyroid Studies No results found for: T4, T3U, TSH, TSHEXT, TSHEXT         Significant Diagnostic Studies: CTA CHEST ABD PELV W CONT    Result Date: 5/2/2022  EXAM: CTA chest, abdomen, and pelvis INDICATION: Bilateral DVT. Shortness of breath. COMPARISON: No prior study. TECHNIQUE: Axial CTA imaging of the chest, abdomen, and pelvis was performed after IV contrast administration. Noncontrast CT scan of the chest was performed prior to intravenous contrast administration. Multiplanar reformats were generated. Maximum intensity images obtained. One or more dose reduction techniques were used on this CT: automated exposure control, adjustment of the mAs and/or kVp according to patient size, and iterative reconstruction techniques. The specific techniques used on this CT exam have been documented in the patient's electronic medical record. Digital Imaging and Communications in Medicine (DICOM) format image data are available to nonaffiliated external healthcare facilities or entities on a secure, media free, reciprocally searchable basis with patient authorization for at least a 12-month period after this study. _______________ FINDINGS: CTA: No evidence of pulmonary embolism. Normal caliber main pulmonary artery. Normal caliber thoracic aorta without evidence of dissection, intramural hematoma or aneurysm. Moderate atherosclerotic burden. No branching of the great vessels. Portal opacification of the abdominal aorta and pelvic vessels limits evaluation. Tortuous abdominal aorta with bilobed infrarenal aortic aneurysm measuring 3.5 cm just below the renal arteries and 3.6 cm just above the bifurcation. Aorta measures 2.0 cm in diameter between the aneurysmal dilatations. Severe stenosis of the origin of the celiac artery. Patent origin of the SMA.  Nondiagnostic evaluation of pelvic arterial vessels. Nonopacification of the vein secondary to bolus timing limits evaluation. CHEST: MEDIASTINUM: Normal size heart. No pericardial effusion. LYMPH NODES: No pathologically enlarged mediastinal or hilar lymph nodes. PLEURA: Unremarkable without pleural effusion or pneumothorax. LUNGS/AIRWAY: There is no consolidation or pneumothorax. No pulmonary nodule is seen. OTHER: None. ABDOMEN/PELVIS: LIVER, BILIARY: Liver is unremarkable. No abnormal biliary dilation. Cholecystectomy. PANCREAS: Unremarkable. SPLEEN: Unremarkable. ADRENALS: Unremarkable. KIDNEYS: Unremarkable. LYMPH NODES: No pathologically enlarged lymph nodes. GASTROINTESTINAL TRACT: No abnormal bowel dilation or wall thickening. Appendix is within normal limits. PELVIC ORGANS: Prior JOYCE/BSO. OSSEOUS: No area of erosion or aggressive-appearing bone destruction. OTHER: None. _______________     No evidence of pulmonary embolism. No evidence of thoracoabdominal aortic dissection or intramural hematoma. Bilobed infrarenal abdominal aortic aneurysm as above. Nondiagnostic evaluation of pelvic arterial and venous vessels secondary to bolus timing. ECHO ADULT COMPLETE    Result Date: 5/3/2022    Technical qualifiers: Echo study was technically difficult due to patient's body habitus.   Contrast used: Definity.   Left Ventricle: Normal left ventricular systolic function with a visually estimated EF of 55 - 60%. Left ventricle size is normal. Mildly increased wall thickness. Findings consistent with mild concentric hypertrophy. Normal wall motion. Grade I diastolic dysfunction with normal LAP.   Insufficient TR jet to comment on estimated PASP     DUPLEX LOWER EXT VENOUS BILAT    Result Date: 5/2/2022  · Acute non-occlusive thrombus present in the right proximal femoral vein. · Acute non-occlusive thrombus present in the right profunda femoral vein. · Acute non-occlusive thrombus present in the left profunda femoral vein. · Acute non-occlusive thrombus present in the left proximal femoral vein. No results found for this or any previous visit. Please note that this dictation was completed with "[employer] the computer voice recognition software. Quite often unanticipated grammatical, syntax, homophones, and other interpretive errors are inadvertently transcribed by the computer software. Please disregard these errors. Please excuse any errors that have escaped final proofreading.      CC: Blanquita, MD Елена

## 2022-05-04 NOTE — PROGRESS NOTES
DC Plan: Home to [place] with family on Sunday, follow up with PCP and vascular MDs    Care manager noted that patient was assessed by PT with recommendation for [facility] PT - noted she walked 150' and was back to baseline; per patient she has rolling walker, rollator, cane and electric wheelchair at home in [facility]; states she has an appt with vascular to have ultrasound done on May 13th and appointment with vascular doctor Dr. Tatum Quintanilla on May 16th; that she will see her PCP Dr. Kenneth Chester on May 26th. Patient instructed that if she feels she needs home marina to contact her PCP to order. 1515:  Eliquis and abx prescriptions filled at [facility] and care manager delivered them to patient room. She will be following up with Eliquis for continuation - she has enough to last for a week and 30 days. Care Management Interventions  PCP Verified by CM: Yes  Palliative Care Criteria Met (RRAT>21 & CHF Dx)?: No  Mode of Transport at Discharge:  Other (see comment) (family)  Support Systems: Child(chaim),Other Family Member(s)  Confirm Follow Up Transport: Family  The Plan for Transition of Care is Related to the Following Treatment Goals : return back to daughters home  Discharge Location  Patient Expects to be Discharged to[de-identified] Home with family assistance

## 2022-05-04 NOTE — PROGRESS NOTES
Pt discharged home. AVS reviewed with patient and samy. Questions and concerns addressed. IVs removed x2 with cathlon intact, dressing applied, no redness or edema noted at sites. Left via Wheelchair with belongings.  Daughter to transport home, no acute distress noted

## 2022-05-04 NOTE — PROGRESS NOTES
physical Therapy EVALUATION & Discharge    Patient: Johnna Aguilar (17 y.o. female)  Date: 5/4/2022  Primary Diagnosis: DVT (deep vein thrombosis) in pregnancy [O22.30]  Vasculitis ([facility] [I77.6]  DVT (deep venous thrombosis) ([facility]) [I82.409]       Precautions: Falls, DVT       ASSESSMENT AND RECOMMENDATIONS:  Based on the objective data described below, the patient presents with bilateral DVT and cellulitis. Pt. Received upright sitting EOB and agreeable to PT. Pt. Reporting need to travel to [place] for primary residence upon d/c. Pt. Mod I for all functional transfers during session although needing cueing for hand placement during sit <>stand w RW. Pt. Ambulated 150ft with RW w mod I with widened EKATERINA and slight antalgic pattern due to pain on RLE from cellulitis on admission. Pt. Reported LBP after ambulation trail but did not demonstrate SOB or dizziness. HHPT is recommended at discharge. No acute care PT needs at this time, pt. Is at or near PLOF. Skilled physical therapy is not indicated at this time. Discharge Recommendations: Home Health  Further Equipment Recommendations for Discharge: N/A      SUBJECTIVE:   Patient stated Im ready to go home.     OBJECTIVE DATA SUMMARY:     Past Medical History:   Diagnosis Date    AAA (abdominal aortic aneurysm) ([facility]     its not big enough to operate on yet she sees a vascular dr Martines Due Chronic obstructive pulmonary disease ([facility]     [facility]     Spinal stenosis    No past surgical history on file.   Barriers to Learning/Limitations: None  Compensate with: visual, verbal, tactile, kinesthetic cues/model  Prior Level of Function/Home Situation:   Home Situation  Support Systems: Child(chaim),Other Family Member(s)  Patient Expects to be Discharged to[de-identified] Home with family assistance  Functional Mobility:  Transfers:  Sit to Stand: Modified independent  Stand to Sit: Modified independent  Balance:   Sitting: Intact  Standing: Intact  Ambulation/Gait Training:  Distance (ft): 150 Feet (ft)  Assistive Device: Walker, rolling;Gait belt  Ambulation - Level of Assistance: Modified independent     Gait Description (WDL): Exceptions to WDL  Base of Support: Widened     Speed/Alesia: Fluctuations  Activity Tolerance:   GOOD  Please refer to the flowsheet for vital signs taken during this treatment. After treatment:   []         Patient left in no apparent distress sitting up in chair  [x]         Patient left in no apparent distress in bed  [x]         Call bell left within reach  [x]         Nursing notified  []         Caregiver present  []         Bed alarm activated    COMMUNICATION/EDUCATION:   [x]         Fall prevention education was provided and the patient/caregiver indicated understanding. []         Patient/family have participated as able in goal setting and plan of care. []         Patient/family agree to work toward stated goals and plan of care. []         Patient understands intent and goals of therapy, but is neutral about his/her participation. []         Patient is unable to participate in goal setting and plan of care.     Thank you for this referral.  Liza Menchaca, PT , DPT   Time Calculation: 24 mins

## 2022-05-07 LAB
BACTERIA SPEC CULT: NORMAL
BACTERIA SPEC CULT: NORMAL
SERVICE CMNT-IMP: NORMAL
SERVICE CMNT-IMP: NORMAL